# Patient Record
Sex: MALE | Race: WHITE | NOT HISPANIC OR LATINO | Employment: OTHER | URBAN - METROPOLITAN AREA
[De-identification: names, ages, dates, MRNs, and addresses within clinical notes are randomized per-mention and may not be internally consistent; named-entity substitution may affect disease eponyms.]

---

## 2017-01-01 ENCOUNTER — HOSPITAL ENCOUNTER (OUTPATIENT)
Dept: CARDIOLOGY | Facility: HOSPITAL | Age: 82
Discharge: HOME | End: 2017-12-27
Admitting: NURSE PRACTITIONER

## 2017-01-01 ENCOUNTER — OFFICE VISIT (OUTPATIENT)
Dept: CARDIOLOGY | Facility: HOSPITAL | Age: 82
End: 2017-01-01

## 2017-01-01 VITALS
SYSTOLIC BLOOD PRESSURE: 116 MMHG | TEMPERATURE: 97 F | WEIGHT: 164 LBS | RESPIRATION RATE: 19 BRPM | HEIGHT: 71 IN | HEART RATE: 68 BPM | OXYGEN SATURATION: 98 % | DIASTOLIC BLOOD PRESSURE: 68 MMHG | BODY MASS INDEX: 22.96 KG/M2

## 2017-01-01 DIAGNOSIS — N18.30 STAGE 3 CHRONIC KIDNEY DISEASE (HCC): ICD-10-CM

## 2017-01-01 DIAGNOSIS — I10 ESSENTIAL HYPERTENSION: ICD-10-CM

## 2017-01-01 DIAGNOSIS — R53.83 OTHER FATIGUE: ICD-10-CM

## 2017-01-01 DIAGNOSIS — R60.9 EDEMA, UNSPECIFIED TYPE: ICD-10-CM

## 2017-01-01 DIAGNOSIS — I48.92 ATRIAL FLUTTER, PAROXYSMAL (HCC): Primary | ICD-10-CM

## 2017-01-01 LAB
ANION GAP SERPL CALCULATED.3IONS-SCNC: 10 MMOL/L (ref 3–11)
BUN BLD-MCNC: 40 MG/DL (ref 9–23)
BUN/CREAT SERPL: 19 (ref 7–25)
CALCIUM SPEC-SCNC: 8.5 MG/DL (ref 8.7–10.4)
CHLORIDE SERPL-SCNC: 110 MMOL/L (ref 99–109)
CO2 SERPL-SCNC: 23 MMOL/L (ref 20–31)
CREAT BLD-MCNC: 2.1 MG/DL (ref 0.6–1.3)
GFR SERPL CREATININE-BSD FRML MDRD: 30 ML/MIN/1.73
GLUCOSE BLD-MCNC: 93 MG/DL (ref 70–100)
POTASSIUM BLD-SCNC: 5 MMOL/L (ref 3.5–5.5)
SODIUM BLD-SCNC: 143 MMOL/L (ref 132–146)

## 2017-01-01 PROCEDURE — 93005 ELECTROCARDIOGRAM TRACING: CPT | Performed by: NURSE PRACTITIONER

## 2017-01-01 PROCEDURE — 80048 BASIC METABOLIC PNL TOTAL CA: CPT | Performed by: NURSE PRACTITIONER

## 2017-01-01 PROCEDURE — 93010 ELECTROCARDIOGRAM REPORT: CPT | Performed by: INTERNAL MEDICINE

## 2017-01-05 ENCOUNTER — OFFICE VISIT (OUTPATIENT)
Dept: INTERNAL MEDICINE | Facility: CLINIC | Age: 82
End: 2017-01-05

## 2017-01-05 ENCOUNTER — TELEPHONE (OUTPATIENT)
Dept: INTERNAL MEDICINE | Facility: CLINIC | Age: 82
End: 2017-01-05

## 2017-01-05 VITALS
HEART RATE: 65 BPM | DIASTOLIC BLOOD PRESSURE: 68 MMHG | TEMPERATURE: 97.9 F | WEIGHT: 162.2 LBS | BODY MASS INDEX: 22.62 KG/M2 | SYSTOLIC BLOOD PRESSURE: 128 MMHG | OXYGEN SATURATION: 98 % | RESPIRATION RATE: 20 BRPM

## 2017-01-05 DIAGNOSIS — S22.000A CLOSED COMPRESSION FRACTURE OF THORACIC VERTEBRA, INITIAL ENCOUNTER (HCC): ICD-10-CM

## 2017-01-05 DIAGNOSIS — S61.219A LACERATION OF FINGER OF RIGHT HAND, INITIAL ENCOUNTER: Primary | ICD-10-CM

## 2017-01-05 PROCEDURE — 99213 OFFICE O/P EST LOW 20 MIN: CPT | Performed by: PHYSICIAN ASSISTANT

## 2017-01-05 RX ORDER — ACETAMINOPHEN WITH CODEINE 300MG-15MG
1 TABLET ORAL EVERY 6 HOURS PRN
Qty: 30 TABLET | Refills: 1 | OUTPATIENT
Start: 2017-01-05 | End: 2017-07-20

## 2017-01-05 RX ORDER — AMLODIPINE BESYLATE 5 MG/1
5 TABLET ORAL DAILY
Qty: 30 TABLET | Refills: 5 | Status: SHIPPED | OUTPATIENT
Start: 2017-01-05 | End: 2017-07-03 | Stop reason: SDUPTHER

## 2017-01-05 NOTE — TELEPHONE ENCOUNTER
Spoke with pharmacist (Fany) to call in Rx for Tylenol #2 per Dr. Aguilar. Fany advised didn't have any in stock but could order it. Spoke with Provider who stated that was fine to have pharmacy order it for patient. Fany stated she would order medication and call back only if there was an issue ordering Tylenol #2. Thanked her and ended the call. Informed Ms. Love (provider) that medication would be ordered.

## 2017-01-05 NOTE — MR AVS SNAPSHOT
Kareem Steiner   1/5/2017 1:00 PM   Office Visit    Provider:  SHANIQUA Littlejohn   Department:  [facility]   Dept Phone:  [phone]                Your Full Care Plan              Where to Get Your Medications      These medications were sent to [facility] [address] [place] [address] - [address] AT [facility] [address] - [phone] PH - [phone] FX  [address], [place] [address]     Phone:  [phone]     amLODIPine 5 MG tablet            Your Updated Medication List          This list is accurate as of: 1/5/17  1:50 PM.  Always use your most recent med list.                amLODIPine 5 MG tablet   Commonly known as:  NORVASC   Take 1 tablet by mouth Daily.       aspirin 325 MG tablet       chlorthalidone 25 MG tablet   Commonly known as:  HYGROTON       desmopressin 0.2 MG tablet   Commonly known as:  DDAVP       donepezil 10 MG tablet   Commonly known as:  ARICEPT   Take 1 tablet by mouth Daily.       EYE VITAMINS capsule       hydrALAZINE 10 MG tablet   Commonly known as:  APRESOLINE   Take 1 tablet by mouth 3 (three) times a day.       melatonin 5 MG tablet tablet   Take 1 tablet by mouth at night as needed (SLEEP).       metoprolol tartrate 25 MG tablet   Commonly known as:  LOPRESSOR       Multiple Vitamins tablet       oxybutynin XL 15 MG 24 hr tablet   Commonly known as:  DITROPAN XL       tamsulosin 0.4 MG capsule 24 hr capsule   Commonly known as:  FLOMAX       traMADol 50 MG tablet   Commonly known as:  ULTRAM   Take 1 tablet by mouth Every 6 (Six) Hours As Needed for moderate pain (4-6).               We Performed the Following     Ambulatory Referral to Hand Surgery     Suture Removal       You Were Diagnosed With        Codes Comments    Laceration of finger of right hand, initial encounter    -  Primary ICD-10-CM: S61.219A  ICD-9-CM: 883.0       Instructions     None    Patient Instructions History      [employer] Signup     Our records indicate that you have an active [facility] Se[employer]eper account.    You can view your After Visit Summary by going to [employer] and logging in with your Seakeeper username and password.  If you don't have a Seakeeper username and password but a parent or guardian has access to your record, the parent or guardian should login with their own Seakeeper username and password and access your record to view the After Visit Summary.    If you have questions, you can email [email or web address] or call [phone] to talk to our Seakeeper staff.  Remember, Seakeeper is NOT to be used for urgent needs.  For medical emergencies, dial 911.               Other Info from Your Visit           Your Appointments     May 10, 2017  9:30 AM EDT   Follow Up with Miriam Browning MD   [facility] (--)    [address] [place] [address] [address]  [facility] [address] [phone]           Arrive 15 minutes prior to appointment.              Allergies     Atorvastatin  Myalgia      Reason for Visit     Suture / Staple Removal pt needs stitches out and would like to discuss pain meds.      Vital Signs     Blood Pressure Pulse Temperature Respirations Weight Oxygen Saturation    128/68 (BP Location: Right arm) 65 97.9 °F (36.6 °C) (Temporal Artery ) 20 162 lb 3.2 oz (73.6 kg) 98%    Body Mass Index Smoking Status                22.62 kg/m2 Former Smoker          Problems and Diagnoses Noted     Open wound of finger

## 2017-01-05 NOTE — PROGRESS NOTES
Subjective   Kareem Steiner is a 89 y.o. male.   Chief Complaint   Patient presents with   • Suture / Staple Removal     pt needs stitches out and would like to discuss pain meds.     History of Present Illness     Pt here for 1 week follow up/suture removal.  Had 6 stiches placed on the palmar surface of his right ring finger on 12/25 after he cut his finger on a metal lamppost.  He has range of motion, but still has some tenderness around the lac.      His wife would also like to discuss the pain medications he was given for his back. He is currently taking tramadol 50mg as needed for a vertebral compression fracture, but wife says that this makes him drowsy for the entirety of the day. He was taking tylenol prior to being prescribed the tramadol.      Needs amlodipine refilled for HTN.     The following portions of the patient's history were reviewed and updated as appropriate: allergies, current medications, past family history, past medical history, past social history, past surgical history and problem list.    Review of Systems   Constitutional: Negative.    HENT: Negative.    Respiratory: Negative.    Cardiovascular: Negative.    Gastrointestinal: Negative.    Musculoskeletal: Positive for back pain.   Skin:        Finger laceration - needs sutures removed        Objective   Physical Exam   Constitutional: He appears well-developed.   Skin:   6 sutures removed from horizontal laceration of right ring finger.  There is some scabbing, but no erythema, warmth or drainage.     Psychiatric: He has a normal mood and affect.     Suture Removal  Date/Time: 1/5/2017 1:47 PM  Performed by: CONNIE GOMEZ  Authorized by: CONNIE GOMEZ   Consent: Verbal consent obtained.  Consent given by: patient  Body area: upper extremity  Location details: right ring finger  Wound Appearance: clean and tender  Sutures Removed: 6  Post-removal: dressing applied  Sutures placed in this facility: [facility].  Patient  tolerance: Patient tolerated the procedure well with no immediate complications          Assessment/Plan   Kareem was seen today for suture / staple removal.    Diagnoses and all orders for this visit:    Laceration of finger of right hand, initial encounter  -     Ambulatory Referral to Hand Surgery  -     Suture Removal    Closed compression fracture of thoracic vertebra, initial encounter  -     acetaminophen-codeine (TYLENOL #2) 300-15 MG per tablet; Take 1 tablet by mouth Every 6 (Six) Hours As Needed for moderate pain (4-6).    Other orders  -     amLODIPine (NORVASC) 5 MG tablet; Take 1 tablet by mouth Daily.    Spoke with Dr. Aguilar regarding pain medications.  Most other pain meds will cause drowsiness as well.  Will try tylenol #2 instead of tramadol.  Discussed this with wife who understands to give either tramadol or tylenol #2, but not both.

## 2017-02-06 RX ORDER — HYDRALAZINE HYDROCHLORIDE 10 MG/1
TABLET, FILM COATED ORAL
Qty: 90 TABLET | Refills: 5 | Status: SHIPPED | OUTPATIENT
Start: 2017-02-06 | End: 2017-08-25 | Stop reason: SDUPTHER

## 2017-05-10 ENCOUNTER — OFFICE VISIT (OUTPATIENT)
Dept: NEUROLOGY | Facility: CLINIC | Age: 82
End: 2017-05-10

## 2017-05-10 VITALS
HEIGHT: 70 IN | WEIGHT: 175 LBS | BODY MASS INDEX: 25.05 KG/M2 | DIASTOLIC BLOOD PRESSURE: 60 MMHG | SYSTOLIC BLOOD PRESSURE: 115 MMHG

## 2017-05-10 DIAGNOSIS — R26.9 ABNORMAL GAIT: ICD-10-CM

## 2017-05-10 DIAGNOSIS — F09 MILD COGNITIVE DISORDER: Primary | ICD-10-CM

## 2017-05-10 PROCEDURE — 99214 OFFICE O/P EST MOD 30 MIN: CPT | Performed by: PSYCHIATRY & NEUROLOGY

## 2017-05-10 RX ORDER — DONEPEZIL HYDROCHLORIDE 10 MG/1
10 TABLET, FILM COATED ORAL DAILY
Qty: 90 TABLET | Refills: 3 | Status: SHIPPED | OUTPATIENT
Start: 2017-05-10 | End: 2018-01-01 | Stop reason: SDUPTHER

## 2017-05-25 ENCOUNTER — APPOINTMENT (OUTPATIENT)
Dept: PHYSICAL THERAPY | Facility: HOSPITAL | Age: 82
End: 2017-05-25

## 2017-06-13 ENCOUNTER — OFFICE VISIT (OUTPATIENT)
Dept: INTERNAL MEDICINE | Facility: CLINIC | Age: 82
End: 2017-06-13

## 2017-06-13 VITALS
WEIGHT: 163 LBS | SYSTOLIC BLOOD PRESSURE: 128 MMHG | TEMPERATURE: 97.8 F | RESPIRATION RATE: 18 BRPM | BODY MASS INDEX: 23.39 KG/M2 | DIASTOLIC BLOOD PRESSURE: 70 MMHG | HEART RATE: 56 BPM

## 2017-06-13 DIAGNOSIS — R53.83 FATIGUE, UNSPECIFIED TYPE: Primary | ICD-10-CM

## 2017-06-13 LAB
ALBUMIN SERPL-MCNC: 3.5 G/DL (ref 3.2–4.8)
ALBUMIN/GLOB SERPL: 1.3 G/DL (ref 1.5–2.5)
ALP SERPL-CCNC: 102 U/L (ref 25–100)
ALT SERPL W P-5'-P-CCNC: 19 U/L (ref 7–40)
ANION GAP SERPL CALCULATED.3IONS-SCNC: 4 MMOL/L (ref 3–11)
AST SERPL-CCNC: 24 U/L (ref 0–33)
BASOPHILS # BLD AUTO: 0.04 10*3/MM3 (ref 0–0.2)
BASOPHILS NFR BLD AUTO: 0.7 % (ref 0–1)
BILIRUB BLD-MCNC: NEGATIVE MG/DL
BILIRUB SERPL-MCNC: 0.3 MG/DL (ref 0.3–1.2)
BUN BLD-MCNC: 48 MG/DL (ref 9–23)
BUN/CREAT SERPL: 21.8 (ref 7–25)
CALCIUM SPEC-SCNC: 9 MG/DL (ref 8.7–10.4)
CHLORIDE SERPL-SCNC: 113 MMOL/L (ref 99–109)
CLARITY, POC: CLEAR
CO2 SERPL-SCNC: 27 MMOL/L (ref 20–31)
COLOR UR: YELLOW
CREAT BLD-MCNC: 2.2 MG/DL (ref 0.6–1.3)
DEPRECATED RDW RBC AUTO: 52.3 FL (ref 37–54)
EOSINOPHIL # BLD AUTO: 0.42 10*3/MM3 (ref 0.1–0.3)
EOSINOPHIL NFR BLD AUTO: 6.8 % (ref 0–3)
ERYTHROCYTE [DISTWIDTH] IN BLOOD BY AUTOMATED COUNT: 15.1 % (ref 11.3–14.5)
EXPIRATION DATE: ABNORMAL
FOLATE SERPL-MCNC: >24 NG/ML (ref 3.2–20)
GFR SERPL CREATININE-BSD FRML MDRD: 28 ML/MIN/1.73
GLOBULIN UR ELPH-MCNC: 2.6 GM/DL
GLUCOSE BLD-MCNC: 91 MG/DL (ref 70–100)
GLUCOSE UR STRIP-MCNC: NEGATIVE MG/DL
HCT VFR BLD AUTO: 33.9 % (ref 38.9–50.9)
HGB BLD-MCNC: 10.5 G/DL (ref 13.1–17.5)
IMM GRANULOCYTES # BLD: 0.03 10*3/MM3 (ref 0–0.03)
IMM GRANULOCYTES NFR BLD: 0.5 % (ref 0–0.6)
KETONES UR QL: NEGATIVE
LEUKOCYTE EST, POC: NEGATIVE
LYMPHOCYTES # BLD AUTO: 0.88 10*3/MM3 (ref 0.6–4.8)
LYMPHOCYTES NFR BLD AUTO: 14.3 % (ref 24–44)
Lab: ABNORMAL
MCH RBC QN AUTO: 29.4 PG (ref 27–31)
MCHC RBC AUTO-ENTMCNC: 31 G/DL (ref 32–36)
MCV RBC AUTO: 95 FL (ref 80–99)
MONOCYTES # BLD AUTO: 0.71 10*3/MM3 (ref 0–1)
MONOCYTES NFR BLD AUTO: 11.6 % (ref 0–12)
NEUTROPHILS # BLD AUTO: 4.06 10*3/MM3 (ref 1.5–8.3)
NEUTROPHILS NFR BLD AUTO: 66.1 % (ref 41–71)
NITRITE UR-MCNC: NEGATIVE MG/ML
PH UR: 5 [PH] (ref 5–8)
PLATELET # BLD AUTO: 195 10*3/MM3 (ref 150–450)
PMV BLD AUTO: 10.8 FL (ref 6–12)
POTASSIUM BLD-SCNC: 4.7 MMOL/L (ref 3.5–5.5)
PROT SERPL-MCNC: 6.1 G/DL (ref 5.7–8.2)
PROT UR STRIP-MCNC: ABNORMAL MG/DL
RBC # BLD AUTO: 3.57 10*6/MM3 (ref 4.2–5.76)
RBC # UR STRIP: NEGATIVE /UL
SODIUM BLD-SCNC: 144 MMOL/L (ref 132–146)
SP GR UR: 1.01 (ref 1–1.03)
T4 FREE SERPL-MCNC: 0.98 NG/DL (ref 0.89–1.76)
TSH SERPL DL<=0.05 MIU/L-ACNC: 1.85 MIU/ML (ref 0.35–5.35)
UROBILINOGEN UR QL: NORMAL
VIT B12 BLD-MCNC: 1066 PG/ML (ref 211–911)
WBC NRBC COR # BLD: 6.14 10*3/MM3 (ref 3.5–10.8)

## 2017-06-13 PROCEDURE — 84443 ASSAY THYROID STIM HORMONE: CPT | Performed by: INTERNAL MEDICINE

## 2017-06-13 PROCEDURE — 82607 VITAMIN B-12: CPT | Performed by: INTERNAL MEDICINE

## 2017-06-13 PROCEDURE — 81003 URINALYSIS AUTO W/O SCOPE: CPT | Performed by: INTERNAL MEDICINE

## 2017-06-13 PROCEDURE — 36415 COLL VENOUS BLD VENIPUNCTURE: CPT | Performed by: INTERNAL MEDICINE

## 2017-06-13 PROCEDURE — 84439 ASSAY OF FREE THYROXINE: CPT | Performed by: INTERNAL MEDICINE

## 2017-06-13 PROCEDURE — 82746 ASSAY OF FOLIC ACID SERUM: CPT | Performed by: INTERNAL MEDICINE

## 2017-06-13 PROCEDURE — 99213 OFFICE O/P EST LOW 20 MIN: CPT | Performed by: INTERNAL MEDICINE

## 2017-06-13 PROCEDURE — 85025 COMPLETE CBC W/AUTO DIFF WBC: CPT | Performed by: INTERNAL MEDICINE

## 2017-06-13 PROCEDURE — 80053 COMPREHEN METABOLIC PANEL: CPT | Performed by: INTERNAL MEDICINE

## 2017-06-13 NOTE — PROGRESS NOTES
Chief Complaint   Patient presents with   • SLEEPING ALL THE TIME       History of Present Illness    The patient has complaints of being fatigued. He describes his symptoms as being sleepy and drowsiness and the symptoms have been present for one month.       He denies tearfulness, loneliness, hopelessness, anxiety or suicidal thoughts.       The patient denies a dry cough, a wet cough, wheezing, fever, facial pain, a headache, eye drainage, ear pain, ear drainage, nausea, vomiting, diarrhea, sore throat, abdominal pain, nasal discharge, decreased appetite, chills, rectal bleeding, night sweats or myalgias. There are no symptoms of heat intolerance, cold intolerance, excessive hair loss or constipation. The patient denies polyuria, polydypsia or polyphagia. There are no known exposures to mononucleosis. The patient does not exercise regularly.    Bedtime is usually at 11 pm. The patient falls asleep easily and typically awakens more than three times. The patient does not snore and denies gasping or stopping breathing at night. He feels tired during the day and he reports that he falls asleep while watching television but denies falling asleep while driving or during conversation.       Medications      Current Outpatient Prescriptions:   •  amLODIPine (NORVASC) 5 MG tablet, Take 1 tablet by mouth Daily., Disp: 30 tablet, Rfl: 5  •  aspirin 325 MG tablet, Take 1 tablet by mouth daily., Disp: , Rfl:   •  Cetirizine HCl (ZYRTEC ALLERGY PO), Take  by mouth., Disp: , Rfl:   •  chlorthalidone (HYGROTEN) 25 MG tablet, Take 50 mg by mouth Daily., Disp: , Rfl:   •  desmopressin (DDAVP) 0.2 MG tablet, Take 1 tablet by mouth every night., Disp: , Rfl:   •  donepezil (ARICEPT) 10 MG tablet, Take 1 tablet by mouth Daily., Disp: 90 tablet, Rfl: 3  •  hydrALAZINE (APRESOLINE) 10 MG tablet, TAKE ONE TABLET BY MOUTH THREE TIMES A DAY, Disp: 90 tablet, Rfl: 5  •  metoprolol tartrate (LOPRESSOR) 25 MG tablet, Take  by mouth. Take  one-half tablet by mouth twice a day, Disp: , Rfl:   •  Multiple Vitamins tablet, Take 1 tablet by mouth daily., Disp: , Rfl:   •  Multiple Vitamins-Minerals (EYE VITAMINS) capsule, Take 1 capsule by mouth daily., Disp: , Rfl:   •  oxybutynin XL (DITROPAN XL) 15 MG 24 hr tablet, Take 1 tablet by mouth daily., Disp: , Rfl:   •  Probiotic Product (PROBIOTIC ADVANCED PO), Take  by mouth., Disp: , Rfl:   •  tamsulosin (FLOMAX) 0.4 MG capsule 24 hr capsule, Take  by mouth., Disp: , Rfl:   •  acetaminophen-codeine (TYLENOL #2) 300-15 MG per tablet, Take 1 tablet by mouth Every 6 (Six) Hours As Needed for moderate pain (4-6)., Disp: 30 tablet, Rfl: 1  •  traMADol (ULTRAM) 50 MG tablet, Take 1 tablet by mouth Every 6 (Six) Hours As Needed for moderate pain (4-6)., Disp: 30 tablet, Rfl: 1     Allergies    Allergies   Allergen Reactions   • Atorvastatin Myalgia       Problem List    Patient Active Problem List   Diagnosis   • Anemia   • Abdominal aortic aneurysm   • Benign prostatic hyperplasia   • Gastroesophageal reflux disease   • Unsteady gait   • Hyperlipidemia   • Hypertension   • Mild cognitive disorder   • Cerebrovascular accident   • Bradycardia   • Constipation   • Laceration of finger of right hand   • Chronic midline low back pain without sciatica   • Closed compression fracture of thoracic vertebra       Medications, Allergies, Problems List and Past History were reviewed and updated.    Physical Examination    /70 (BP Location: Left arm, Patient Position: Sitting, Cuff Size: Adult)  Pulse 56  Temp 97.8 °F (36.6 °C) (Temporal Artery )   Resp 18  Wt 163 lb (73.9 kg)  BMI 23.39 kg/m2    HEENT: Head- Normocephalic Atraumatic. Facies- Within normal limits. Pinnas- Normal texture and shape bilaterally. Canals- Normal bilaterally. TMs- Normal bilaterally. Nares- Patent bilaterally. Nasal Septum- is normal. There is no tenderness to palpation over the frontal or maxillary sinuses. Lids- Normal bilaterally.  Conjunctiva- Clear bilaterally. Sclera- Anicteric bilaterally. Oropharynx- Moist with no lesions. Tonsils- No enlargement, erythema or exudate.    Neck: Thyroid- non enlarged, symmetric and has no nodules. No bruits are detected. ROM- Normal Range of Motion with no rigidity.    Lymph Nodes: Cervical- no enlarged lymph nodes noted. Clavicular- Deferred. Axillary- Deferred. Inguinal- Deferred.    Lungs: Auscultation- Clear to auscultation bilaterally. There are no retractions, clubbing or cyanosis. The Expiratory to Inspiratory ratio is equal.    Cardiovascular: There are no carotid bruits. Heart- Normal Rate with Regular rhythm and no murmurs. There are no gallops. There are no rubs. In the lower extremities there is no edema. The upper extremities do not have edema.    Abdomen: Soft, benign, non-tender with no masses, hernias, organomegaly or scars.    Impression and Assessment    Fatigue.    Plan    Fatigue Plan: Further plans will be made after test results are received and reviewed.    Kareem was seen today for sleeping all the time.    Diagnoses and all orders for this visit:    Fatigue, unspecified type  -     Comprehensive Metabolic Panel  -     CBC & Differential  -     Vitamin B12  -     Folate  -     TSH  -     T4, Free  -     POC Urinalysis Dipstick, Automated        Return to Office    The patient was instructed to return for follow-up at the next scheduled visit.    The patient was instructed to return sooner if the condition changes, worsens, or doesn't resolve.

## 2017-06-29 ENCOUNTER — OFFICE VISIT (OUTPATIENT)
Dept: INTERNAL MEDICINE | Facility: CLINIC | Age: 82
End: 2017-06-29

## 2017-06-29 VITALS
SYSTOLIC BLOOD PRESSURE: 118 MMHG | WEIGHT: 167 LBS | RESPIRATION RATE: 18 BRPM | BODY MASS INDEX: 23.96 KG/M2 | TEMPERATURE: 97.8 F | DIASTOLIC BLOOD PRESSURE: 72 MMHG

## 2017-06-29 DIAGNOSIS — G45.9 TRANSIENT CEREBRAL ISCHEMIA, UNSPECIFIED TYPE: ICD-10-CM

## 2017-06-29 DIAGNOSIS — I48.91 ATRIAL FIBRILLATION, UNSPECIFIED TYPE (HCC): Primary | ICD-10-CM

## 2017-06-29 PROCEDURE — 99213 OFFICE O/P EST LOW 20 MIN: CPT | Performed by: NURSE PRACTITIONER

## 2017-06-29 NOTE — PROGRESS NOTES
Concern for a fib      Subjective     History of Present Illness   The patient and wife percent clinic today with reports that had a local screening at a [employer] and found to have atrial fibrillation was advised to come here for further lab evaluation.  He denies any chest pain shortness of breath swelling.  Wife reports he said this problem with falling asleep frequently she has had some concern.        The following portions of the patient's history were reviewed and updated as appropriate: allergies, current medications, past family history, past medical history, past social history, past surgical history and problem list.    Review of Systems  No headache fever dizziness visual changes no recent cold symptoms such as runny nose cough congestion.  No abdominal pain nausea vomiting diarrhea.  No new skin changes lumps or bumps.  Objective   Physical Exam   Constitutional: He appears well-developed and well-nourished.   HENT:   Head: Normocephalic and atraumatic.   Right Ear: External ear normal.   Left Ear: External ear normal.   Nose: Nose normal.   Mouth/Throat: Oropharynx is clear and moist.   Cardiovascular: Normal rate, regular rhythm, normal heart sounds and intact distal pulses.    No murmur heard.  Pulmonary/Chest: Effort normal and breath sounds normal.   Abdominal: Soft. Bowel sounds are normal.   Skin: Skin is warm and dry.   Psychiatric: He has a normal mood and affect. His behavior is normal.   Nursing note and vitals reviewed.      Results for orders placed or performed in visit on 06/13/17   Comprehensive Metabolic Panel   Result Value Ref Range    Glucose 91 70 - 100 mg/dL    BUN 48 (H) 9 - 23 mg/dL    Creatinine 2.20 (H) 0.60 - 1.30 mg/dL    Sodium 144 132 - 146 mmol/L    Potassium 4.7 3.5 - 5.5 mmol/L    Chloride 113 (H) 99 - 109 mmol/L    CO2 27.0 20.0 - 31.0 mmol/L    Calcium 9.0 8.7 - 10.4 mg/dL    Total Protein 6.1 5.7 - 8.2 g/dL    Albumin 3.50 3.20 - 4.80 g/dL    ALT (SGPT) 19 7 - 40 U/L     AST (SGOT) 24 0 - 33 U/L    Alkaline Phosphatase 102 (H) 25 - 100 U/L    Total Bilirubin 0.3 0.3 - 1.2 mg/dL    eGFR Non African Amer 28 (L) >60 mL/min/1.73    Globulin 2.6 gm/dL    A/G Ratio 1.3 (L) 1.5 - 2.5 g/dL    BUN/Creatinine Ratio 21.8 7.0 - 25.0    Anion Gap 4.0 3.0 - 11.0 mmol/L   Vitamin B12   Result Value Ref Range    Vitamin B-12 1066 (H) 211 - 911 pg/mL   Folate   Result Value Ref Range    Folate >24.00 (H) 3.20 - 20.00 ng/mL   TSH   Result Value Ref Range    TSH 1.855 0.350 - 5.350 mIU/mL   T4, Free   Result Value Ref Range    Free T4 0.98 0.89 - 1.76 ng/dL   CBC Auto Differential   Result Value Ref Range    WBC 6.14 3.50 - 10.80 10*3/mm3    RBC 3.57 (L) 4.20 - 5.76 10*6/mm3    Hemoglobin 10.5 (L) 13.1 - 17.5 g/dL    Hematocrit 33.9 (L) 38.9 - 50.9 %    MCV 95.0 80.0 - 99.0 fL    MCH 29.4 27.0 - 31.0 pg    MCHC 31.0 (L) 32.0 - 36.0 g/dL    RDW 15.1 (H) 11.3 - 14.5 %    RDW-SD 52.3 37.0 - 54.0 fl    MPV 10.8 6.0 - 12.0 fL    Platelets 195 150 - 450 10*3/mm3    Neutrophil % 66.1 41.0 - 71.0 %    Lymphocyte % 14.3 (L) 24.0 - 44.0 %    Monocyte % 11.6 0.0 - 12.0 %    Eosinophil % 6.8 (H) 0.0 - 3.0 %    Basophil % 0.7 0.0 - 1.0 %    Immature Grans % 0.5 0.0 - 0.6 %    Neutrophils, Absolute 4.06 1.50 - 8.30 10*3/mm3    Lymphocytes, Absolute 0.88 0.60 - 4.80 10*3/mm3    Monocytes, Absolute 0.71 0.00 - 1.00 10*3/mm3    Eosinophils, Absolute 0.42 (H) 0.10 - 0.30 10*3/mm3    Basophils, Absolute 0.04 0.00 - 0.20 10*3/mm3    Immature Grans, Absolute 0.03 0.00 - 0.03 10*3/mm3   POC Urinalysis Dipstick, Automated   Result Value Ref Range    Color Yellow Yellow, Straw, Dark Yellow, Celestina    Clarity, UA Clear Clear    Glucose, UA Negative Negative, 1000 mg/dL (3+) mg/dL    Bilirubin Negative Negative    Ketones, UA Negative Negative    Specific Gravity  1.015 1.005 - 1.030    Blood, UA Negative Negative    pH, Urine 5.0 5.0 - 8.0    Protein, POC 30 mg/dL (A) Negative mg/dL    Urobilinogen, UA Normal Normal     Leukocytes Negative Negative    Nitrite, UA Negative Negative    Lot Number [id number]     Expiration Date 2-28-18         Assessment/Plan   Diagnoses and all orders for this visit:    Atrial fibrillation, unspecified type  -     Adult Transthoracic Echo Complete; Future  -     Ambulatory Referral to Cardiology    Transient cerebral ischemia, unspecified type  -     Adult Transthoracic Echo Complete; Future  -     Ambulatory Referral to Cardiology    Cr cl 22.48    ekg done 6/29/17    ECG 12 Lead  Date/Time: 6/30/2017 7:27 PM  Performed by: LILIA VIDES  Authorized by: LILIA VIDES   Comparison: compared with previous ECG   Rhythm: atrial fibrillation  Clinical impression: dysrhythmia - atrial        Will set up echo due to history of TIA as well as new-onset of A. fib.  Will set up with A. fib clinic to further evaluate and discuss with patient and wife today possibility of starting anticoagulation versus continuing the aspirin.  We'll have a cardiology consult to further evaluate as well.  He develops any chest pain or shortness of breath was advised to go to the ER he verbalizes understanding.      RTC/call  If symptoms worsen  Meds MOA and SE's reviewed and pt v/u

## 2017-06-30 ENCOUNTER — APPOINTMENT (OUTPATIENT)
Dept: LAB | Facility: HOSPITAL | Age: 82
End: 2017-06-30

## 2017-06-30 ENCOUNTER — HOSPITAL ENCOUNTER (OUTPATIENT)
Dept: CARDIOLOGY | Facility: HOSPITAL | Age: 82
Discharge: HOME | End: 2017-06-30
Admitting: NURSE PRACTITIONER

## 2017-06-30 ENCOUNTER — HOSPITAL ENCOUNTER (OUTPATIENT)
Dept: CARDIOLOGY | Facility: HOSPITAL | Age: 82
Discharge: HOME | End: 2017-06-30

## 2017-06-30 ENCOUNTER — OFFICE VISIT (OUTPATIENT)
Dept: CARDIOLOGY | Facility: HOSPITAL | Age: 82
End: 2017-06-30

## 2017-06-30 ENCOUNTER — PROCEDURE VISIT (OUTPATIENT)
Dept: CARDIOLOGY | Facility: HOSPITAL | Age: 82
End: 2017-06-30

## 2017-06-30 VITALS
BODY MASS INDEX: 23.96 KG/M2 | WEIGHT: 167.4 LBS | OXYGEN SATURATION: 97 % | RESPIRATION RATE: 16 BRPM | SYSTOLIC BLOOD PRESSURE: 111 MMHG | HEART RATE: 51 BPM | DIASTOLIC BLOOD PRESSURE: 56 MMHG | HEIGHT: 70 IN | TEMPERATURE: 97.7 F

## 2017-06-30 DIAGNOSIS — G45.9 TRANSIENT CEREBRAL ISCHEMIA, UNSPECIFIED TYPE: ICD-10-CM

## 2017-06-30 DIAGNOSIS — N18.3 CKD (CHRONIC KIDNEY DISEASE), STAGE 3 (MODERATE): ICD-10-CM

## 2017-06-30 DIAGNOSIS — R60.9 EDEMA, UNSPECIFIED TYPE: ICD-10-CM

## 2017-06-30 DIAGNOSIS — I10 ESSENTIAL HYPERTENSION: ICD-10-CM

## 2017-06-30 DIAGNOSIS — R00.1 BRADYCARDIA: Primary | ICD-10-CM

## 2017-06-30 DIAGNOSIS — I48.0 PAROXYSMAL ATRIAL FIBRILLATION (HCC): ICD-10-CM

## 2017-06-30 DIAGNOSIS — I48.91 ATRIAL FIBRILLATION, UNSPECIFIED TYPE (HCC): ICD-10-CM

## 2017-06-30 DIAGNOSIS — R00.1 BRADYCARDIA: ICD-10-CM

## 2017-06-30 PROBLEM — I77.9 RIGHT-SIDED CAROTID ARTERY DISEASE (HCC): Status: ACTIVE | Noted: 2017-06-30

## 2017-06-30 LAB
ANION GAP SERPL CALCULATED.3IONS-SCNC: 4 MMOL/L (ref 3–11)
BH CV ECHO MEAS - AI DEC SLOPE: 151.1 CM/SEC^2
BH CV ECHO MEAS - AI MAX PG: 42.8 MMHG
BH CV ECHO MEAS - AI MAX VEL: 327 CM/SEC
BH CV ECHO MEAS - AI P1/2T: 633.9 MSEC
BH CV ECHO MEAS - AO MAX PG (FULL): 7.6 MMHG
BH CV ECHO MEAS - AO MAX PG: 13.2 MMHG
BH CV ECHO MEAS - AO MEAN PG (FULL): 4.7 MMHG
BH CV ECHO MEAS - AO MEAN PG: 7.5 MMHG
BH CV ECHO MEAS - AO ROOT AREA (BSA CORRECTED): 1.6
BH CV ECHO MEAS - AO ROOT AREA: 8 CM^2
BH CV ECHO MEAS - AO ROOT DIAM: 3.2 CM
BH CV ECHO MEAS - AO V2 MAX: 181.9 CM/SEC
BH CV ECHO MEAS - AO V2 MEAN: 130.3 CM/SEC
BH CV ECHO MEAS - AO V2 VTI: 50.9 CM
BH CV ECHO MEAS - AVA(I,A): 2 CM^2
BH CV ECHO MEAS - AVA(I,D): 2 CM^2
BH CV ECHO MEAS - AVA(V,A): 2.4 CM^2
BH CV ECHO MEAS - AVA(V,D): 2.4 CM^2
BH CV ECHO MEAS - BSA(HAYCOCK): 2 M^2
BH CV ECHO MEAS - BSA: 1.9 M^2
BH CV ECHO MEAS - BZI_BMI: 24.2 KILOGRAMS/M^2
BH CV ECHO MEAS - BZI_METRIC_HEIGHT: 177.8 CM
BH CV ECHO MEAS - BZI_METRIC_WEIGHT: 76.7 KG
BH CV ECHO MEAS - EDV(CUBED): 77.6 ML
BH CV ECHO MEAS - EDV(TEICH): 81.5 ML
BH CV ECHO MEAS - EF(CUBED): 66.3 %
BH CV ECHO MEAS - EF(TEICH): 58.2 %
BH CV ECHO MEAS - ESV(CUBED): 26.2 ML
BH CV ECHO MEAS - ESV(TEICH): 34.1 ML
BH CV ECHO MEAS - FS: 30.4 %
BH CV ECHO MEAS - IVS/LVPW: 1
BH CV ECHO MEAS - IVSD: 1.2 CM
BH CV ECHO MEAS - LA DIMENSION: 4.2 CM
BH CV ECHO MEAS - LA/AO: 1.3
BH CV ECHO MEAS - LV MASS(C)D: 169.3 GRAMS
BH CV ECHO MEAS - LV MASS(C)DI: 87.2 GRAMS/M^2
BH CV ECHO MEAS - LV MAX PG: 5.6 MMHG
BH CV ECHO MEAS - LV MEAN PG: 2.8 MMHG
BH CV ECHO MEAS - LV V1 MAX: 118.5 CM/SEC
BH CV ECHO MEAS - LV V1 MEAN: 78.1 CM/SEC
BH CV ECHO MEAS - LV V1 VTI: 27.5 CM
BH CV ECHO MEAS - LVIDD: 4.3 CM
BH CV ECHO MEAS - LVIDS: 3 CM
BH CV ECHO MEAS - LVOT AREA (M): 3.8 CM^2
BH CV ECHO MEAS - LVOT AREA: 3.7 CM^2
BH CV ECHO MEAS - LVOT DIAM: 2.2 CM
BH CV ECHO MEAS - LVPWD: 1.1 CM
BH CV ECHO MEAS - MV A MAX VEL: 53.8 CM/SEC
BH CV ECHO MEAS - MV DEC TIME: 0.33 SEC
BH CV ECHO MEAS - MV E MAX VEL: 66.1 CM/SEC
BH CV ECHO MEAS - MV E/A: 1.2
BH CV ECHO MEAS - RAP SYSTOLE: 10 MMHG
BH CV ECHO MEAS - RV MAX PG: 1.2 MMHG
BH CV ECHO MEAS - RV V1 MAX: 53.8 CM/SEC
BH CV ECHO MEAS - RVSP: 35.6 MMHG
BH CV ECHO MEAS - SI(AO): 208.9 ML/M^2
BH CV ECHO MEAS - SI(CUBED): 26.5 ML/M^2
BH CV ECHO MEAS - SI(LVOT): 53.1 ML/M^2
BH CV ECHO MEAS - SI(TEICH): 24.4 ML/M^2
BH CV ECHO MEAS - SV(AO): 406 ML
BH CV ECHO MEAS - SV(CUBED): 51.5 ML
BH CV ECHO MEAS - SV(LVOT): 103.1 ML
BH CV ECHO MEAS - SV(TEICH): 47.4 ML
BH CV ECHO MEAS - TAPSE (>1.6): 1.6 CM2
BH CV ECHO MEAS - TR MAX VEL: 253 CM/SEC
BH CV XLRA - RV BASE: 3.2 CM
BH CV XLRA - RV LENGTH: 6.7 CM
BH CV XLRA - RV MID: 3.1 CM
BH CV XLRA - TDI S': 4.97 CM/SEC
BUN BLD-MCNC: 40 MG/DL (ref 9–23)
BUN/CREAT SERPL: 20 (ref 7–25)
CALCIUM SPEC-SCNC: 9 MG/DL (ref 8.7–10.4)
CHLORIDE SERPL-SCNC: 111 MMOL/L (ref 99–109)
CO2 SERPL-SCNC: 27 MMOL/L (ref 20–31)
CREAT BLD-MCNC: 2 MG/DL (ref 0.6–1.3)
GFR SERPL CREATININE-BSD FRML MDRD: 31 ML/MIN/1.73
GLUCOSE BLD-MCNC: 89 MG/DL (ref 70–100)
LEFT ATRIUM VOLUME INDEX: 26.8 ML/M2
LEFT ATRIUM VOLUME: 52 CM3
LV EF 2D ECHO EST: 60 %
POTASSIUM BLD-SCNC: 5.1 MMOL/L (ref 3.5–5.5)
SODIUM BLD-SCNC: 142 MMOL/L (ref 132–146)

## 2017-06-30 PROCEDURE — 93005 ELECTROCARDIOGRAM TRACING: CPT

## 2017-06-30 PROCEDURE — 93000 ELECTROCARDIOGRAM COMPLETE: CPT | Performed by: NURSE PRACTITIONER

## 2017-06-30 PROCEDURE — 93306 TTE W/DOPPLER COMPLETE: CPT | Performed by: INTERNAL MEDICINE

## 2017-06-30 PROCEDURE — 93010 ELECTROCARDIOGRAM REPORT: CPT | Performed by: INTERNAL MEDICINE

## 2017-06-30 PROCEDURE — 99204 OFFICE O/P NEW MOD 45 MIN: CPT | Performed by: NURSE PRACTITIONER

## 2017-06-30 PROCEDURE — 93306 TTE W/DOPPLER COMPLETE: CPT

## 2017-06-30 PROCEDURE — 93270 REMOTE 30 DAY ECG REV/REPORT: CPT

## 2017-06-30 PROCEDURE — 80048 BASIC METABOLIC PNL TOTAL CA: CPT | Performed by: NURSE PRACTITIONER

## 2017-06-30 NOTE — PATIENT INSTRUCTIONS
Atrial Fibrillation  Atrial fibrillation is a type of irregular or rapid heartbeat (arrhythmia). In atrial fibrillation, the heart quivers continuously in a chaotic pattern. This occurs when parts of the heart receive disorganized signals that make the heart unable to pump blood normally. This can increase the risk for stroke, heart failure, and other heart-related conditions. There are different types of atrial fibrillation, including:  · Paroxysmal atrial fibrillation. This type starts suddenly, and it usually stops on its own shortly after it starts.  · Persistent atrial fibrillation. This type often lasts longer than a week. It may stop on its own or with treatment.  · Long-lasting persistent atrial fibrillation. This type lasts longer than 12 months.  · Permanent atrial fibrillation. This type does not go away.  Talk with your health care provider to learn about the type of atrial fibrillation that you have.  CAUSES  This condition is caused by some heart-related conditions or procedures, including:  · A heart attack.  · Coronary artery disease.  · Heart failure.  · Heart valve conditions.  · High blood pressure.  · Inflammation of the sac that surrounds the heart (pericarditis).  · Heart surgery.  · Certain heart rhythm disorders, such as Stone-Parkinson-White syndrome.  Other causes include:  · Pneumonia.  · Obstructive sleep apnea.  · Blockage of an artery in the lungs (pulmonary embolism, or PE).  · Lung cancer.  · Chronic lung disease.  · Thyroid problems, especially if the thyroid is overactive (hyperthyroidism).  · Caffeine.  · Excessive alcohol use or illegal drug use.  · Use of some medicines, including certain decongestants and diet pills.  Sometimes, the cause cannot be found.  RISK FACTORS  This condition is more likely to develop in:  · People who are older in age.  · People who smoke.  · People who have diabetes mellitus.  · People who are overweight (obese).  · Athletes who exercise  vigorously.  SYMPTOMS  Symptoms of this condition include:  · A feeling that your heart is beating rapidly or irregularly.  · A feeling of discomfort or pain in your chest.  · Shortness of breath.  · Sudden light-headedness or weakness.  · Getting tired easily during exercise.  In some cases, there are no symptoms.  DIAGNOSIS  Your health care provider may be able to detect atrial fibrillation when taking your pulse. If detected, this condition may be diagnosed with:  · An electrocardiogram (ECG).  · A Holter monitor test that records your heartbeat patterns over a 24-hour period.  · Transthoracic echocardiogram (TTE) to evaluate how blood flows through your heart.  · Transesophageal echocardiogram (AMY) to view more detailed images of your heart.  · A stress test.  · Imaging tests, such as a CT scan or chest X-ray.  · Blood tests.  TREATMENT  The main goals of treatment are to prevent blood clots from forming and to keep your heart beating at a normal rate and rhythm. The type of treatment that you receive depends on many factors, such as your underlying medical conditions and how you feel when you are experiencing atrial fibrillation.  This condition may be treated with:  · Medicine to slow down the heart rate, bring the heart's rhythm back to normal, or prevent clots from forming.  · Electrical cardioversion. This is a procedure that resets your heart's rhythm by delivering a controlled, low-energy shock to the heart through your skin.  · Different types of ablation, such as catheter ablation, catheter ablation with pacemaker, or surgical ablation. These procedures destroy the heart tissues that send abnormal signals. When the pacemaker is used, it is placed under your skin to help your heart beat in a regular rhythm.  HOME CARE INSTRUCTIONS  · Take over-the counter and prescription medicines only as told by your health care provider.  · If your health care provider prescribed a blood-thinning medicine  (anticoagulant), take it exactly as told. Taking too much blood-thinning medicine can cause bleeding. If you do not take enough blood-thinning medicine, you will not have the protection that you need against stroke and other problems.  · Do not use tobacco products, including cigarettes, chewing tobacco, and e-cigarettes. If you need help quitting, ask your health care provider.  · If you have obstructive sleep apnea, manage your condition as told by your health care provider.  · Do not drink alcohol.  · Do not drink beverages that contain caffeine, such as coffee, soda, and tea.  · Maintain a healthy weight. Do not use diet pills unless your health care provider approves. Diet pills may make heart problems worse.  · Follow diet instructions as told by your health care provider.  · Exercise regularly as told by your health care provider.  · Keep all follow-up visits as told by your health care provider. This is important.  PREVENTION  · Avoid drinking beverages that contain caffeine or alcohol.  · Avoid certain medicines, especially medicines that are used for breathing problems.  · Avoid certain herbs and herbal medicines, such as those that contain ephedra or ginseng.  · Do not use illegal drugs, such as cocaine and amphetamines.  · Do not smoke.  · Manage your high blood pressure.  SEEK MEDICAL CARE IF:  · You notice a change in the rate, rhythm, or strength of your heartbeat.  · You are taking an anticoagulant and you notice increased bruising.  · You tire more easily when you exercise or exert yourself.  SEEK IMMEDIATE MEDICAL CARE IF:  · You have chest pain, abdominal pain, sweating, or weakness.  · You feel nauseous.  · You notice blood in your vomit, bowel movement, or urine.  · You have shortness of breath.  · You suddenly have swollen feet and ankles.  · You feel dizzy.  · You have sudden weakness or numbness of the face, arm, or leg, especially on one side of the body.  · You have trouble speaking,  trouble understanding, or both (aphasia).  · Your face or your eyelid droops on one side.  These symptoms may represent a serious problem that is an emergency. Do not wait to see if the symptoms will go away. Get medical help right away. Call your local emergency services (911 in the [place] Do not drive yourself to the hospital.     This information is not intended to replace advice given to you by your health care provider. Make sure you discuss any questions you have with your health care provider.     Document Released: 12/18/2006 Document Revised: 09/07/2016 Document Reviewed: 04/13/2016  [employer] Interactive Patient Education ©2017 [employer]

## 2017-06-30 NOTE — PROGRESS NOTES
[facility]      Encounter Date:06/30/2017     Kareem Steiner  [address] [place] [address]  [phone]    5/3/1924    Eloy Aguilar MD    Kareem Steiner is a 89 y.o. male.      Subjective:     Chief Complaint:  Establish Care (irregular heart rate)       HPI     Pt asked to be seen today by PCP for evaluation of possible atrial fib.  Pt Reports having a local health screening at his [employer].  He was told he needed to f/u with his PCP for evaluation of atrial fib.  Patient has a history of hypertension, carotid artery disease s/p Rt CEA, history of  TIA versus CVA, mild cognitive disorder.  Pt has CKD III, followed by nephrology, with chronic lower extremity edema.  States that edema is at his baseline currently.  Usually wears compression stocking, but not wearing today.  No wounds/drainage or recent cellulitis.    Pt denies CP, pressure, palpitations, dizziness, syncope, worsening dyspnea.  Pt complains of fatigue.  Wife states that he falls asleep very easily while reading and sitting in his chair during the day.  He does not sleep well at night, and has not for many years.  NO hx of sleep study.  Reports loss of balance with ambulating and has had falls in the past without injuries.    Patient Active Problem List    Diagnosis   • Right-sided carotid artery disease [I77.9]     Overview Note:     S/p Rt CEA 2014     • Closed compression fracture of thoracic vertebra [S22.000A]   • Laceration of finger of right hand [S61.219A]   • Chronic midline low back pain without sciatica [M54.5, G89.29]   • Anemia [D64.9]   • Abdominal aortic aneurysm [I71.4]   • Benign prostatic hyperplasia [N40.0]   • Gastroesophageal reflux disease [K21.9]   • Unsteady gait [R26.81]   • Hyperlipidemia [E78.5]   • Hypertension [I10]   • Mild cognitive disorder [F09]   • Cerebrovascular accident [I63.9]   • Bradycardia [R00.1]   • Constipation [K59.00]         Past Surgical History:    Procedure Laterality Date   • APPENDECTOMY     • BLADDER SURGERY     • CATARACT EXTRACTION     • PROSTATE SURGERY     • THROMBOENDARTERECTOMY         Allergies   Allergen Reactions   • Atorvastatin Myalgia         Current Outpatient Prescriptions:   •  acetaminophen-codeine (TYLENOL #2) 300-15 MG per tablet, Take 1 tablet by mouth Every 6 (Six) Hours As Needed for moderate pain (4-6)., Disp: 30 tablet, Rfl: 1  •  amLODIPine (NORVASC) 5 MG tablet, Take 1 tablet by mouth Daily., Disp: 30 tablet, Rfl: 5  •  aspirin 325 MG tablet, Take 1 tablet by mouth daily., Disp: , Rfl:   •  Cetirizine HCl (ZYRTEC ALLERGY PO), Take 10 mg by mouth Daily., Disp: , Rfl:   •  chlorthalidone (HYGROTEN) 25 MG tablet, Take 50 mg by mouth Daily., Disp: , Rfl:   •  desmopressin (DDAVP) 0.2 MG tablet, Take 1 tablet by mouth every night., Disp: , Rfl:   •  donepezil (ARICEPT) 10 MG tablet, Take 1 tablet by mouth Daily., Disp: 90 tablet, Rfl: 3  •  hydrALAZINE (APRESOLINE) 10 MG tablet, TAKE ONE TABLET BY MOUTH THREE TIMES A DAY, Disp: 90 tablet, Rfl: 5  •  metoprolol tartrate (LOPRESSOR) 25 MG tablet, Take 1 tablets by mouth 2 (Two) Times a Day., Disp: , Rfl:   •  Multiple Vitamins tablet, Take 1 tablet by mouth daily., Disp: , Rfl:   •  Multiple Vitamins-Minerals (EYE VITAMINS) capsule, Take 1 capsule by mouth daily., Disp: , Rfl:   •  Probiotic Product (PROBIOTIC ADVANCED PO), Take 1 capsule by mouth Daily., Disp: , Rfl:   •  tamsulosin (FLOMAX) 0.4 MG capsule 24 hr capsule, Take 1 capsule by mouth Daily., Disp: , Rfl:   •  traMADol (ULTRAM) 50 MG tablet, Take 1 tablet by mouth Every 6 (Six) Hours As Needed for moderate pain (4-6)., Disp: 30 tablet, Rfl: 1    The following portions of the patient's history were reviewed and updated as appropriate: allergies, current medications, past family history, past medical history, past social history, past surgical history and problem list.    Review of Systems   Constitution: Positive for  "malaise/fatigue. Negative for chills, decreased appetite, diaphoresis, fever, weakness, night sweats, weight gain and weight loss.   HENT: Positive for congestion and hearing loss. Negative for headaches and nosebleeds.         Postnasal drip   Eyes: Negative for blurred vision, visual disturbance and visual halos.   Cardiovascular: Positive for leg swelling. Negative for chest pain, claudication, cyanosis, dyspnea on exertion, irregular heartbeat, near-syncope, orthopnea, palpitations, paroxysmal nocturnal dyspnea and syncope.   Respiratory: Positive for cough, snoring and sputum production. Negative for hemoptysis, shortness of breath, sleep disturbances due to breathing and wheezing.    Endocrine: Positive for cold intolerance and polyuria. Negative for heat intolerance, polydipsia and polyphagia.   Hematologic/Lymphatic: Bruises/bleeds easily.   Skin: Negative for dry skin, itching and rash.   Musculoskeletal: Positive for falls and muscle cramps. Negative for joint pain, joint swelling, muscle weakness and myalgias.   Gastrointestinal: Negative for bloating, abdominal pain, constipation, diarrhea, dysphagia, heartburn, melena, nausea and vomiting.   Genitourinary: Positive for frequency and nocturia. Negative for dysuria, flank pain and hematuria.   Neurological: Positive for excessive daytime sleepiness and loss of balance. Negative for difficulty with concentration and dizziness.   Psychiatric/Behavioral: Positive for altered mental status and depression. The patient has insomnia. The patient is not nervous/anxious.    Allergic/Immunologic: Negative for environmental allergies.        Seasonal allergies       Objective:     Vitals:    06/30/17 1347 06/30/17 1350   BP: 149/74 111/56   BP Location: Right arm Left arm   Patient Position: Sitting Sitting   Pulse: 50 51   Resp: 16    Temp: 97.7 °F (36.5 °C)    TempSrc: Temporal Artery     SpO2: 97%    Weight: 167 lb 6.4 oz (75.9 kg)    Height: 70\" (177.8 cm)  "         Physical Exam   Constitutional: He is oriented to person, place, and time. He appears well-developed and well-nourished. No distress.   HENT:   Head: Normocephalic and atraumatic.   Mouth/Throat: Oropharynx is clear and moist.   Eyes: Conjunctivae are normal. Pupils are equal, round, and reactive to light. No scleral icterus.   Neck: No hepatojugular reflux and no JVD present. Carotid bruit is not present. No tracheal deviation present. No thyromegaly present.   Cardiovascular: Regular rhythm, normal heart sounds and intact distal pulses.  Bradycardia present.  Exam reveals no friction rub.    No murmur heard.  Pulmonary/Chest: Effort normal and breath sounds normal.   Abdominal: Soft. Bowel sounds are normal. He exhibits no distension. There is no tenderness.   Musculoskeletal: He exhibits edema (2+ bilateral edema (lower extremity)).   Lymphadenopathy:     He has no cervical adenopathy.   Neurological: He is alert and oriented to person, place, and time.   Skin: Skin is warm, dry and intact. No rash noted. No cyanosis or erythema. No pallor.   Psychiatric: He has a normal mood and affect. His behavior is normal. Thought content normal.   Vitals reviewed.      Lab and Diagnostic Review:  Office Visit on 06/13/2017   Component Date Value Ref Range Status   • Glucose 06/13/2017 91  70 - 100 mg/dL Final   • BUN 06/13/2017 48* 9 - 23 mg/dL Final   • Creatinine 06/13/2017 2.20* 0.60 - 1.30 mg/dL Final   • Sodium 06/13/2017 144  132 - 146 mmol/L Final   • Potassium 06/13/2017 4.7  3.5 - 5.5 mmol/L Final   • Chloride 06/13/2017 113* 99 - 109 mmol/L Final   • CO2 06/13/2017 27.0  20.0 - 31.0 mmol/L Final   • Calcium 06/13/2017 9.0  8.7 - 10.4 mg/dL Final   • Total Protein 06/13/2017 6.1  5.7 - 8.2 g/dL Final   • Albumin 06/13/2017 3.50  3.20 - 4.80 g/dL Final   • ALT (SGPT) 06/13/2017 19  7 - 40 U/L Final   • AST (SGOT) 06/13/2017 24  0 - 33 U/L Final   • Alkaline Phosphatase 06/13/2017 102* 25 - 100 U/L Final   •  Total Bilirubin 06/13/2017 0.3  0.3 - 1.2 mg/dL Final   • eGFR Non African Amer 06/13/2017 28* >60 mL/min/1.73 Final   • Globulin 06/13/2017 2.6  gm/dL Final   • A/G Ratio 06/13/2017 1.3* 1.5 - 2.5 g/dL Final   • BUN/Creatinine Ratio 06/13/2017 21.8  7.0 - 25.0 Final   • Anion Gap 06/13/2017 4.0  3.0 - 11.0 mmol/L Final   • Vitamin B-12 06/13/2017 1066* 211 - 911 pg/mL Final   • Folate 06/13/2017 >24.00* 3.20 - 20.00 ng/mL Final   • TSH 06/13/2017 1.855  0.350 - 5.350 mIU/mL Final   • Free T4 06/13/2017 0.98  0.89 - 1.76 ng/dL Final   • Color 06/13/2017 Yellow  Yellow, Straw, Dark Yellow, Celestina Final   • Clarity, UA 06/13/2017 Clear  Clear Final   • Glucose, UA 06/13/2017 Negative  Negative, 1000 mg/dL (3+) mg/dL Final   • Bilirubin 06/13/2017 Negative  Negative Final   • Ketones, UA 06/13/2017 Negative  Negative Final   • Specific Gravity  06/13/2017 1.015  1.005 - 1.030 Final   • Blood, UA 06/13/2017 Negative  Negative Final   • pH, Urine 06/13/2017 5.0  5.0 - 8.0 Final   • Protein, POC 06/13/2017 30 mg/dL* Negative mg/dL Final   • Urobilinogen, UA 06/13/2017 Normal  Normal Final   • Leukocytes 06/13/2017 Negative  Negative Final   • Nitrite, UA 06/13/2017 Negative  Negative Final   • Lot Number 06/13/2017 [id number]   Final   • Expiration Date 06/13/2017 2-28-18   Final   • WBC 06/13/2017 6.14  3.50 - 10.80 10*3/mm3 Final   • RBC 06/13/2017 3.57* 4.20 - 5.76 10*6/mm3 Final   • Hemoglobin 06/13/2017 10.5* 13.1 - 17.5 g/dL Final   • Hematocrit 06/13/2017 33.9* 38.9 - 50.9 % Final   • MCV 06/13/2017 95.0  80.0 - 99.0 fL Final   • MCH 06/13/2017 29.4  27.0 - 31.0 pg Final   • MCHC 06/13/2017 31.0* 32.0 - 36.0 g/dL Final   • RDW 06/13/2017 15.1* 11.3 - 14.5 % Final   • RDW-SD 06/13/2017 52.3  37.0 - 54.0 fl Final   • MPV 06/13/2017 10.8  6.0 - 12.0 fL Final   • Platelets 06/13/2017 195  150 - 450 10*3/mm3 Final   • Neutrophil % 06/13/2017 66.1  41.0 - 71.0 % Final   • Lymphocyte % 06/13/2017 14.3* 24.0 - 44.0 % Final    • Monocyte % 06/13/2017 11.6  0.0 - 12.0 % Final   • Eosinophil % 06/13/2017 6.8* 0.0 - 3.0 % Final   • Basophil % 06/13/2017 0.7  0.0 - 1.0 % Final   • Immature Grans % 06/13/2017 0.5  0.0 - 0.6 % Final   • Neutrophils, Absolute 06/13/2017 4.06  1.50 - 8.30 10*3/mm3 Final   • Lymphocytes, Absolute 06/13/2017 0.88  0.60 - 4.80 10*3/mm3 Final   • Monocytes, Absolute 06/13/2017 0.71  0.00 - 1.00 10*3/mm3 Final   • Eosinophils, Absolute 06/13/2017 0.42* 0.10 - 0.30 10*3/mm3 Final   • Basophils, Absolute 06/13/2017 0.04  0.00 - 0.20 10*3/mm3 Final   • Immature Grans, Absolute 06/13/2017 0.03  0.00 - 0.03 10*3/mm3 Final       Assessment and Plan:         1. Bradycardia  Recent concern for Arrhythmia during a life line health screening.  No documentation at the present.    - ECG 12 Lead; sinus bradycardia with first-degree AV block, 51 bpm, FL interval 416 ms    Decrease Metoprolol 25 mg 1/2 table.    - Cardiac Event Monitor; 30 day event, Dr. Ellison to read    Echo is scheduled later today.    2. Essential hypertension  Amlodipine, hydralazine, BB,     3. Edema, unspecified type  Chlorthalidone  Wear Compression stocking daily and keep legs elevated when sitting    4. CKD (chronic kidney disease), stage 3 (moderate)  Recent increase in creatinine 2.2, recheck labs today.  Followed by nephrology.  - Basic Metabolic Panel    F/u 6-8 weeks or sooner if needed.        *Please note that portions of this note were completed with a voice recognition program. Efforts were made to edit the dictations, but occasionally words are mistranscribed.

## 2017-07-03 ENCOUNTER — APPOINTMENT (OUTPATIENT)
Dept: CARDIOLOGY | Facility: HOSPITAL | Age: 82
End: 2017-07-03

## 2017-07-03 RX ORDER — AMLODIPINE BESYLATE 5 MG/1
TABLET ORAL
Qty: 30 TABLET | Refills: 5 | Status: SHIPPED | OUTPATIENT
Start: 2017-07-03 | End: 2018-01-01 | Stop reason: SDUPTHER

## 2017-07-06 ENCOUNTER — TELEPHONE (OUTPATIENT)
Dept: CARDIOLOGY | Facility: HOSPITAL | Age: 82
End: 2017-07-06

## 2017-07-06 DIAGNOSIS — I44.30 AVB (ATRIOVENTRICULAR BLOCK): Primary | ICD-10-CM

## 2017-07-06 NOTE — PROGRESS NOTES
Pt recently placed on 30 day event monitor.  Received a critical event strip showing AVB 2:1, Pause of 4.1 seconds.    Pt will stop BB.  Referral for cardiology to be completed.

## 2017-07-06 NOTE — TELEPHONE ENCOUNTER
Based on today's exam and after a review of the records, the determination was made for LUCENTIS 0.5mg treatment within 2615 [facility] Left message on patients home phone answering service.  Called daughter, Omaira, that is listed to call in regards to patient.    New orders given to have patient stop the Metoprolol and that a cardiology referral had been placed related to the cardiac monitor reports showing a low heart rate.  Omaira, patient's daughter verbally stated understanding.  Questions/concerns answered during this time.  Miriam Mccormick RN

## 2017-07-11 ENCOUNTER — OFFICE VISIT (OUTPATIENT)
Dept: CARDIOLOGY | Facility: CLINIC | Age: 82
End: 2017-07-11

## 2017-07-11 DIAGNOSIS — R00.2 PALPITATIONS: ICD-10-CM

## 2017-07-11 PROCEDURE — 93272 ECG/REVIEW INTERPRET ONLY: CPT | Performed by: INTERNAL MEDICINE

## 2017-07-14 ENCOUNTER — DOCUMENTATION (OUTPATIENT)
Dept: CARDIOLOGY | Facility: HOSPITAL | Age: 82
End: 2017-07-14

## 2017-07-14 NOTE — PROGRESS NOTES
Received event monitor strips were reviewed collected on 7/11/17 showing atrial flutter with variable conduction.  It has been noted by Dr. Ellison.  Patient is scheduled for cardiology consult with Dr. Gomez on 8/2/17.  BB recently stopped due to bradycardia (HR 30-50s) and pauses.  Pt is a [place] 5, currently on ASA, but is also a high risk for falls.  Anticoagulation per cardiology.

## 2017-07-20 ENCOUNTER — OFFICE VISIT (OUTPATIENT)
Dept: INTERNAL MEDICINE | Facility: CLINIC | Age: 82
End: 2017-07-20

## 2017-07-20 VITALS
SYSTOLIC BLOOD PRESSURE: 124 MMHG | DIASTOLIC BLOOD PRESSURE: 60 MMHG | RESPIRATION RATE: 18 BRPM | WEIGHT: 169 LBS | HEART RATE: 60 BPM | TEMPERATURE: 97.4 F | BODY MASS INDEX: 24.25 KG/M2

## 2017-07-20 DIAGNOSIS — K21.9 GASTROESOPHAGEAL REFLUX DISEASE WITHOUT ESOPHAGITIS: ICD-10-CM

## 2017-07-20 DIAGNOSIS — E78.5 HYPERLIPIDEMIA, UNSPECIFIED HYPERLIPIDEMIA TYPE: ICD-10-CM

## 2017-07-20 DIAGNOSIS — I48.91 ATRIAL FIBRILLATION, UNSPECIFIED TYPE (HCC): Primary | ICD-10-CM

## 2017-07-20 DIAGNOSIS — I10 ESSENTIAL HYPERTENSION: ICD-10-CM

## 2017-07-20 LAB
ALBUMIN SERPL-MCNC: 3.4 G/DL (ref 3.2–4.8)
ALBUMIN/GLOB SERPL: 1.5 G/DL (ref 1.5–2.5)
ALP SERPL-CCNC: 101 U/L (ref 25–100)
ALT SERPL W P-5'-P-CCNC: 16 U/L (ref 7–40)
ANION GAP SERPL CALCULATED.3IONS-SCNC: 7 MMOL/L (ref 3–11)
ARTICHOKE IGE QN: 82 MG/DL (ref 0–130)
AST SERPL-CCNC: 17 U/L (ref 0–33)
BASOPHILS # BLD AUTO: 0.04 10*3/MM3 (ref 0–0.2)
BASOPHILS NFR BLD AUTO: 0.6 % (ref 0–1)
BILIRUB SERPL-MCNC: 0.3 MG/DL (ref 0.3–1.2)
BUN BLD-MCNC: 54 MG/DL (ref 9–23)
BUN/CREAT SERPL: 27 (ref 7–25)
CALCIUM SPEC-SCNC: 8.8 MG/DL (ref 8.7–10.4)
CHLORIDE SERPL-SCNC: 114 MMOL/L (ref 99–109)
CHOLEST SERPL-MCNC: 153 MG/DL (ref 0–200)
CO2 SERPL-SCNC: 22 MMOL/L (ref 20–31)
CREAT BLD-MCNC: 2 MG/DL (ref 0.6–1.3)
DEPRECATED RDW RBC AUTO: 49.6 FL (ref 37–54)
EOSINOPHIL # BLD AUTO: 0.4 10*3/MM3 (ref 0–0.3)
EOSINOPHIL NFR BLD AUTO: 6.4 % (ref 0–3)
ERYTHROCYTE [DISTWIDTH] IN BLOOD BY AUTOMATED COUNT: 14.7 % (ref 11.3–14.5)
GFR SERPL CREATININE-BSD FRML MDRD: 31 ML/MIN/1.73
GLOBULIN UR ELPH-MCNC: 2.3 GM/DL
GLUCOSE BLD-MCNC: 77 MG/DL (ref 70–100)
HCT VFR BLD AUTO: 30.4 % (ref 38.9–50.9)
HDLC SERPL-MCNC: 58 MG/DL (ref 40–60)
HGB BLD-MCNC: 9.6 G/DL (ref 13.1–17.5)
IMM GRANULOCYTES # BLD: 0.03 10*3/MM3 (ref 0–0.03)
IMM GRANULOCYTES NFR BLD: 0.5 % (ref 0–0.6)
LYMPHOCYTES # BLD AUTO: 0.84 10*3/MM3 (ref 0.6–4.8)
LYMPHOCYTES NFR BLD AUTO: 13.5 % (ref 24–44)
MCH RBC QN AUTO: 28.8 PG (ref 27–31)
MCHC RBC AUTO-ENTMCNC: 31.6 G/DL (ref 32–36)
MCV RBC AUTO: 91.3 FL (ref 80–99)
MONOCYTES # BLD AUTO: 0.69 10*3/MM3 (ref 0–1)
MONOCYTES NFR BLD AUTO: 11.1 % (ref 0–12)
NEUTROPHILS # BLD AUTO: 4.24 10*3/MM3 (ref 1.5–8.3)
NEUTROPHILS NFR BLD AUTO: 67.9 % (ref 41–71)
PLATELET # BLD AUTO: 229 10*3/MM3 (ref 150–450)
PMV BLD AUTO: 10.1 FL (ref 6–12)
POTASSIUM BLD-SCNC: 4.4 MMOL/L (ref 3.5–5.5)
PROT SERPL-MCNC: 5.7 G/DL (ref 5.7–8.2)
RBC # BLD AUTO: 3.33 10*6/MM3 (ref 4.2–5.76)
SODIUM BLD-SCNC: 143 MMOL/L (ref 132–146)
TRIGL SERPL-MCNC: 55 MG/DL (ref 0–150)
TSH SERPL DL<=0.05 MIU/L-ACNC: 1.59 MIU/ML (ref 0.35–5.35)
WBC NRBC COR # BLD: 6.24 10*3/MM3 (ref 3.5–10.8)

## 2017-07-20 PROCEDURE — 80053 COMPREHEN METABOLIC PANEL: CPT | Performed by: INTERNAL MEDICINE

## 2017-07-20 PROCEDURE — 36415 COLL VENOUS BLD VENIPUNCTURE: CPT | Performed by: INTERNAL MEDICINE

## 2017-07-20 PROCEDURE — 85025 COMPLETE CBC W/AUTO DIFF WBC: CPT | Performed by: INTERNAL MEDICINE

## 2017-07-20 PROCEDURE — 80061 LIPID PANEL: CPT | Performed by: INTERNAL MEDICINE

## 2017-07-20 PROCEDURE — 99214 OFFICE O/P EST MOD 30 MIN: CPT | Performed by: INTERNAL MEDICINE

## 2017-07-20 PROCEDURE — 84443 ASSAY THYROID STIM HORMONE: CPT | Performed by: INTERNAL MEDICINE

## 2017-07-20 RX ORDER — OXYBUTYNIN CHLORIDE 15 MG/1
15 TABLET, EXTENDED RELEASE ORAL DAILY
COMMUNITY
End: 2018-01-01

## 2017-07-20 RX ORDER — FAMOTIDINE 20 MG/1
20 TABLET, FILM COATED ORAL 2 TIMES DAILY
Qty: 60 TABLET | Refills: 5 | Status: SHIPPED | OUTPATIENT
Start: 2017-07-20 | End: 2018-01-01 | Stop reason: SDUPTHER

## 2017-07-20 NOTE — PROGRESS NOTES
Chief Complaint   Patient presents with   • Follow-up       History of Present Illness    The patient presents for a follow-up related to hyperlipidemia. He is following a low fat diet. He reports that he is exercising. He is not taking medication for hyperlipidemia. He denies chest pain, shortness of breath, orthopnea, paroxysmal nocturnal dyspnea, dyspnea on exertion, edema, palpitations or syncope.    The patient presents for a follow-up related to hypertension. The patient reports that he has had no headaches or blurred vision. He states that he is taking his medication as prescribed. He is not having medication side effects.    The patient is not on medication for his gastroesophageal reflux. He reports no abdominal pain, belching, diarrhea, dysphagia, early satiety, heartburn, hoarseness, nausea, odynophagia, rectal bleeding, vomiting or weight loss. He has a cough after eating. The GERD has no known aggravating factors.    The patient presents for a follow-up of chronic atrial fibrillation and he denies palpitations. The patient denies fatigue, dizziness or exercise intolerance.    Medications      Current Outpatient Prescriptions:   •  amLODIPine (NORVASC) 5 MG tablet, TAKE ONE TABLET BY MOUTH DAILY, Disp: 30 tablet, Rfl: 5  •  aspirin 325 MG tablet, Take 1 tablet by mouth daily., Disp: , Rfl:   •  Cetirizine HCl (ZYRTEC ALLERGY PO), Take 10 mg by mouth Daily., Disp: , Rfl:   •  donepezil (ARICEPT) 10 MG tablet, Take 1 tablet by mouth Daily., Disp: 90 tablet, Rfl: 3  •  hydrALAZINE (APRESOLINE) 10 MG tablet, TAKE ONE TABLET BY MOUTH THREE TIMES A DAY, Disp: 90 tablet, Rfl: 5  •  Multiple Vitamins-Minerals (EYE VITAMINS) capsule, Take 1 capsule by mouth daily., Disp: , Rfl:   •  oxybutynin XL (DITROPAN XL) 15 MG 24 hr tablet, Take 15 mg by mouth Daily., Disp: , Rfl:   •  Probiotic Product (PROBIOTIC ADVANCED PO), Take 1 capsule by mouth Daily., Disp: , Rfl:   •  tamsulosin (FLOMAX) 0.4 MG capsule 24 hr  capsule, Take 1 capsule by mouth Daily., Disp: , Rfl:      Allergies    Allergies   Allergen Reactions   • Atorvastatin Myalgia       Problem List    Patient Active Problem List   Diagnosis   • Anemia   • Abdominal aortic aneurysm   • Benign prostatic hyperplasia   • Gastroesophageal reflux disease   • Unsteady gait   • Hyperlipidemia   • Hypertension   • Mild cognitive disorder   • Cerebrovascular accident   • Bradycardia   • Constipation   • Laceration of finger of right hand   • Chronic midline low back pain without sciatica   • Closed compression fracture of thoracic vertebra   • Right-sided carotid artery disease       Medications, Allergies, Problems List and Past History were reviewed and updated.    Physical Examination    /60 (BP Location: Left arm, Patient Position: Sitting, Cuff Size: Adult)  Pulse 60  Temp 97.4 °F (36.3 °C) (Temporal Artery )   Resp 18  Wt 169 lb (76.7 kg)  BMI 24.25 kg/m2    HEENT: Head- Normocephalic Atraumatic. Facies- Within normal limits. Pinnas- Normal texture and shape bilaterally. Canals- Normal bilaterally. TMs- Normal bilaterally. Nares- Patent bilaterally. Nasal Septum- is normal. There is no tenderness to palpation over the frontal or maxillary sinuses. Lids- Normal bilaterally. Conjunctiva- Clear bilaterally. Sclera- Anicteric bilaterally. Oropharynx- Moist with no lesions. Tonsils- No enlargement, erythema or exudate.    Neck: Thyroid- non enlarged, symmetric and has no nodules. No bruits are detected. ROM- Normal Range of Motion with no rigidity.    Lungs: Auscultation- Clear to auscultation bilaterally. There are no retractions, clubbing or cyanosis. The Expiratory to Inspiratory ratio is equal.    Cardiovascular: There are no carotid bruits. Heart- Normal Rate with Irregularly Irregular rhythm and no murmurs. There are no gallops. There are no rubs. In the lower extremities there is no edema. The upper extremities do not have edema.    Abdomen: Soft, benign,  non-tender with no masses, hernias, organomegaly or scars.    Impression and Assessment    Hyperlipidemia.    Essential Hypertension.    Gastroesophageal Reflux Disease.    Atrial Fibrillation.    Plan    Gastroesophageal Reflux Disease Plan: Medication will be added as noted below.    Hyperlipidemia Plan: He was instructed to eat a low fat diet. He was encouraged to exercise daily.    Essential Hypertension Plan: The current plan was continued.    Atrial Fibrillation Plan: The patient will follow-up with his cardiologist.    Kareem was seen today for follow-up.    Diagnoses and all orders for this visit:    Atrial fibrillation, unspecified type  -     Comprehensive Metabolic Panel  -     CBC & Differential  -     TSH    Essential hypertension  -     Comprehensive Metabolic Panel  -     TSH    Hyperlipidemia, unspecified hyperlipidemia type  -     Comprehensive Metabolic Panel  -     Lipid Panel    Gastroesophageal reflux disease without esophagitis  -     CBC & Differential  -     famotidine (PEPCID) 20 MG tablet; Take 1 tablet by mouth 2 (Two) Times a Day.        Return to Office    The patient was instructed to return for follow-up in 1 month.    The patient was instructed to return sooner if the condition changes, worsens, or doesn't resolve.

## 2017-08-02 ENCOUNTER — CONSULT (OUTPATIENT)
Dept: CARDIOLOGY | Facility: CLINIC | Age: 82
End: 2017-08-02

## 2017-08-02 VITALS
HEART RATE: 63 BPM | HEIGHT: 70 IN | BODY MASS INDEX: 25.34 KG/M2 | SYSTOLIC BLOOD PRESSURE: 134 MMHG | DIASTOLIC BLOOD PRESSURE: 75 MMHG | WEIGHT: 177 LBS

## 2017-08-02 DIAGNOSIS — I48.3 TYPICAL ATRIAL FLUTTER (HCC): ICD-10-CM

## 2017-08-02 DIAGNOSIS — I10 ESSENTIAL HYPERTENSION: Primary | ICD-10-CM

## 2017-08-02 PROBLEM — K56.609 SMALL BOWEL OBSTRUCTION (HCC): Status: ACTIVE | Noted: 2017-08-02

## 2017-08-02 PROBLEM — C61 PROSTATE CA (HCC): Status: ACTIVE | Noted: 2017-08-02

## 2017-08-02 PROBLEM — N62 GYNECOMASTIA: Status: ACTIVE | Noted: 2017-08-02

## 2017-08-02 PROBLEM — R41.3 MEMORY LOSS: Status: ACTIVE | Noted: 2017-08-02

## 2017-08-02 PROBLEM — R41.82 ALTERED MENTAL STATE: Status: ACTIVE | Noted: 2017-08-02

## 2017-08-02 PROBLEM — I48.92 ATRIAL FLUTTER (HCC): Status: ACTIVE | Noted: 2017-08-02

## 2017-08-02 PROBLEM — H91.90 DEAFNESS: Status: ACTIVE | Noted: 2017-08-02

## 2017-08-02 PROCEDURE — 99214 OFFICE O/P EST MOD 30 MIN: CPT | Performed by: INTERNAL MEDICINE

## 2017-08-02 NOTE — PROGRESS NOTES
"[facility]  Consultation H&P  Kareem Steiner  5/3/1924  [phone]    VISIT DATE:  08/02/2017    PCP: Eloy Aguialr MD  [address] [place], [place] [id number]    CC:  Chief Complaint   Patient presents with   • Slow Heart Rate     consult   • Palpitations       ASSESSMENT:   Diagnosis Plan   1. Essential hypertension     2. Typical atrial flutter       Symptomatic paroxysmal atrial flutter - advance Vascor equal to 6  High risk for chronic anticoagulation  Chronic venous insufficiency  Peripheral vascular disease status post right carotid endarterectomy  History of TIA  Anemia of chronic disease  CKD stage III  High-grade AV block, asymptomatic    PLAN:  Continue aspirin 325 mg by mouth daily for stroke prophylaxis  Agree with current antihypertensive regimen  Recommending pacemaker implantation unless he becomes symptomatic with his bradyarrhythmia.  Follow-up in 6 months    History of Present Illness   Recently was noted have an irregular heart rhythm during routine physical screening.  He underwent 30 day event monitor which revealed brief episodes of asymptomatic rate controlled paroxysmal atrial flutter and one episode of transient high-grade AV block in which he dropped 2 consecutive P waves with a total pause of 4.1 seconds..  He reports stable functional status.  Uses a cane for ambulation for short distances and uses a wheelchair for longer distances.  He denies chest pain, palpitations, dyspnea on exertion.  His unsteady on his feet and has fallen recently.  Most recent transthoracic echo which revealed preserved left ventricular systolic function and age-related valvular heart disease which was not hemodynamically significant.    PHYSICAL EXAMINATION:  Vitals:    08/02/17 1505   BP: 134/75   BP Location: Left arm   Patient Position: Sitting   Pulse: 63   Weight: 177 lb (80.3 kg)   Height: 70\" (177.8 cm)     General Appearance:    Alert, cooperative, no " distress, appears stated age   Head:    Normocephalic, without obvious abnormality, atraumatic   Eyes:    PERRL, conjunctiva/corneas clear, EOM's intact, fundi     benign, both eyes   Ears:    Normal TM's and external ear canals, both ears   Nose:   Nares normal, septum midline, mucosa normal, no drainage    or sinus tenderness   Throat:   Lips, mucosa, and tongue normal; teeth and gums normal   Neck:   Supple, symmetrical, trachea midline, no adenopathy;     thyroid:  no enlargement/tenderness/nodules; no carotid    bruit or JVD   Back:     Symmetric, no curvature, ROM normal, no CVA tenderness   Lungs:     Clear to auscultation bilaterally, respirations unlabored   Chest Wall:    No tenderness or deformity    Heart:    Regular rate and rhythm, S1 and S2 normal,II/VI sm rusb, rub   or gallop, normal carotid impulse bilaterally without bruit.   Abdomen:     Soft, non-tender, bowel sounds active all four quadrants,     no masses, no organomegaly   Extremities:   Extremities normal, atraumatic, no cyanosis. 3-4+ bialteral le edema   Pulses:   2+ and symmetric all extremities   Skin:   Skin color, texture, turgor normal, no rashes or lesions   Lymph nodes:   Cervical, supraclavicular, and axillary nodes normal           Diagnostic Data:  Procedures  Lab Results   Component Value Date    TRIG 55 07/20/2017    HDL 58 07/20/2017    LDLDIRECT 82 07/20/2017     Lab Results   Component Value Date    GLUCOSE 77 07/20/2017    BUN 54 (H) 07/20/2017    CREATININE 2.00 (H) 07/20/2017     07/20/2017    K 4.4 07/20/2017     (H) 07/20/2017    CO2 22.0 07/20/2017     Lab Results   Component Value Date    HGBA1C 5.4 10/26/2014     Lab Results   Component Value Date    WBC 6.24 07/20/2017    HGB 9.6 (L) 07/20/2017    HCT 30.4 (L) 07/20/2017     07/20/2017       PROBLEM LIST:  Patient Active Problem List   Diagnosis   • Anemia   • Abdominal aortic aneurysm   • Benign prostatic hyperplasia   • Gastroesophageal reflux  disease   • Unsteady gait   • Hyperlipidemia   • Hypertension   • Mild cognitive disorder   • Cerebrovascular accident   • Bradycardia   • Constipation   • Laceration of finger of right hand   • Chronic midline low back pain without sciatica   • Closed compression fracture of thoracic vertebra   • Right-sided carotid artery disease   • Gynecomastia   • Memory loss   • Altered mental state   • Small bowel obstruction   • Deafness   • Prostate CA   • Atrial flutter       PAST MEDICAL HX  Past Medical History:   Diagnosis Date   • Abrasion of face    • Acute sinusitis    • Clostridium difficile colitis    • Deafness    • Diarrhea    • Drowsiness    • Edema    • Gait instability    • Hyperlipidemia    • Hypertension    • Joint pain    • Knee pain    • Prostate cancer    • TIA (transient ischemic attack)    • Urinary frequency        Allergies  Allergies   Allergen Reactions   • Atorvastatin Myalgia       Current Medications    Current Outpatient Prescriptions:   •  amLODIPine (NORVASC) 5 MG tablet, TAKE ONE TABLET BY MOUTH DAILY, Disp: 30 tablet, Rfl: 5  •  aspirin 325 MG tablet, Take 1 tablet by mouth daily., Disp: , Rfl:   •  Cetirizine HCl (ZYRTEC ALLERGY PO), Take 10 mg by mouth Daily., Disp: , Rfl:   •  donepezil (ARICEPT) 10 MG tablet, Take 1 tablet by mouth Daily., Disp: 90 tablet, Rfl: 3  •  famotidine (PEPCID) 20 MG tablet, Take 1 tablet by mouth 2 (Two) Times a Day., Disp: 60 tablet, Rfl: 5  •  hydrALAZINE (APRESOLINE) 10 MG tablet, TAKE ONE TABLET BY MOUTH THREE TIMES A DAY, Disp: 90 tablet, Rfl: 5  •  Multiple Vitamins-Minerals (EYE VITAMINS) capsule, Take 1 capsule by mouth daily., Disp: , Rfl:   •  oxybutynin XL (DITROPAN XL) 15 MG 24 hr tablet, Take 15 mg by mouth Daily., Disp: , Rfl:   •  Probiotic Product (PROBIOTIC ADVANCED PO), Take 1 capsule by mouth Daily., Disp: , Rfl:   •  tamsulosin (FLOMAX) 0.4 MG capsule 24 hr capsule, Take 1 capsule by mouth Daily., Disp: , Rfl:          ROS  Review of Systems    Constitution: Positive for chills, weakness and malaise/fatigue.   Cardiovascular: Positive for chest pain, irregular heartbeat and leg swelling.   Respiratory: Positive for cough and snoring.    Endocrine: Positive for cold intolerance, heat intolerance and polyuria.   Skin: Positive for itching and skin cancer.   Musculoskeletal: Positive for back pain, falls, joint swelling, muscle cramps and myalgias.   Gastrointestinal: Positive for bloating, change in bowel habit, constipation, diarrhea and heartburn.   Genitourinary: Positive for frequency.   Neurological: Positive for disturbances in coordination and loss of balance.       SOCIAL HX  Social History     Social History   • Marital status:      Spouse name: N/A   • Number of children: N/A   • Years of education: N/A     Occupational History   • Not on file.     Social History Main Topics   • Smoking status: Former Smoker   • Smokeless tobacco: Never Used      Comment: smoked a pipe daily for 50 yrs, quit 30 yrs ago   • Alcohol use No   • Drug use: No   • Sexual activity: Defer     Other Topics Concern   • Not on file     Social History Narrative    Patient consumes 3 serving of caffeine daily.     Patient lives at home with wife.            FAMILY HX  Family History   Problem Relation Age of Onset   • Diabetes Other    • Stroke Father    • Heart attack Father              Wil Gomez III, MD, FACC

## 2017-08-03 ENCOUNTER — OFFICE VISIT (OUTPATIENT)
Dept: CARDIOLOGY | Facility: CLINIC | Age: 82
End: 2017-08-03

## 2017-08-03 ENCOUNTER — TELEPHONE (OUTPATIENT)
Dept: INTERNAL MEDICINE | Facility: CLINIC | Age: 82
End: 2017-08-03

## 2017-08-03 DIAGNOSIS — R00.2 PALPITATIONS: ICD-10-CM

## 2017-08-03 PROCEDURE — 93272 ECG/REVIEW INTERPRET ONLY: CPT | Performed by: INTERNAL MEDICINE

## 2017-08-03 NOTE — TELEPHONE ENCOUNTER
S/W PT WIFE, STATES THEY FINALLY GOT IN TO SEE CARDIOLOGIST YESTERDAY BUT DO NOT FEEL THEY FOUND ANYTHING OUT.  STATES THEY SAW DR KAYE BUT THAT HE WAS IN AND OUT IN LESS THAN 5 MINUTES AND DID NOT TELL HIM ANYTHING OTHER THAN HE WOULD SEE HIM BACK IN 4 MONTHS.  STATES SHE DOESN'T KNOW IF HE HAD RESULTS OF ALL THE TESTS HE HAS HAD DONE OR NOT.  STATES HE DID NOT TELL THEM ANY RESULTS.  EXPL THAT DR KAYE HAS ACCESS TO PT'S FULL CHART AND THAT RSULTS AND REPORTS ARE IN CHART.  STATES SHE WAS JUST WANTING TO MAKE SURE EVERYTHING THAT NEEDED TO BE DONE WAS DONE AND THAT EVERYTHING IS OKAY FOR NOW.

## 2017-08-03 NOTE — TELEPHONE ENCOUNTER
----- Message from Karis Clement sent at 8/3/2017  9:31 AM EDT -----  [facility] [phone]  WIFE WOULD LIKE TO DISCUSS PT'S CARDIOLOGISTS, PLEASE CALL [facility] TO DISCUSS

## 2017-08-17 PROBLEM — I44.30 AV HEART BLOCK: Status: ACTIVE | Noted: 2017-08-17

## 2017-08-25 ENCOUNTER — OFFICE VISIT (OUTPATIENT)
Dept: CARDIOLOGY | Facility: HOSPITAL | Age: 82
End: 2017-08-25

## 2017-08-25 VITALS
SYSTOLIC BLOOD PRESSURE: 135 MMHG | HEIGHT: 71 IN | HEART RATE: 65 BPM | OXYGEN SATURATION: 99 % | TEMPERATURE: 97.2 F | WEIGHT: 172.8 LBS | RESPIRATION RATE: 16 BRPM | BODY MASS INDEX: 24.19 KG/M2 | DIASTOLIC BLOOD PRESSURE: 75 MMHG

## 2017-08-25 DIAGNOSIS — I48.3 TYPICAL ATRIAL FLUTTER (HCC): ICD-10-CM

## 2017-08-25 DIAGNOSIS — N18.3 CKD (CHRONIC KIDNEY DISEASE), STAGE 3 (MODERATE): ICD-10-CM

## 2017-08-25 DIAGNOSIS — I44.30 AV HEART BLOCK: ICD-10-CM

## 2017-08-25 DIAGNOSIS — R00.1 BRADYCARDIA: ICD-10-CM

## 2017-08-25 DIAGNOSIS — I10 ESSENTIAL HYPERTENSION: ICD-10-CM

## 2017-08-25 DIAGNOSIS — R60.9 EDEMA, UNSPECIFIED TYPE: Primary | ICD-10-CM

## 2017-08-25 LAB
ANION GAP SERPL CALCULATED.3IONS-SCNC: 11 MMOL/L (ref 3–11)
BASOPHILS # BLD AUTO: 0.04 10*3/MM3 (ref 0–0.2)
BASOPHILS NFR BLD AUTO: 0.6 % (ref 0–1)
BUN BLD-MCNC: 40 MG/DL (ref 9–23)
BUN/CREAT SERPL: 21.1 (ref 7–25)
CALCIUM SPEC-SCNC: 8.1 MG/DL (ref 8.7–10.4)
CHLORIDE SERPL-SCNC: 117 MMOL/L (ref 99–109)
CO2 SERPL-SCNC: 16 MMOL/L (ref 20–31)
CREAT BLD-MCNC: 1.9 MG/DL (ref 0.6–1.3)
DEPRECATED RDW RBC AUTO: 54.6 FL (ref 37–54)
EOSINOPHIL # BLD AUTO: 0.33 10*3/MM3 (ref 0–0.3)
EOSINOPHIL NFR BLD AUTO: 5 % (ref 0–3)
ERYTHROCYTE [DISTWIDTH] IN BLOOD BY AUTOMATED COUNT: 15.5 % (ref 11.3–14.5)
GFR SERPL CREATININE-BSD FRML MDRD: 33 ML/MIN/1.73
GLUCOSE BLD-MCNC: 78 MG/DL (ref 70–100)
HCT VFR BLD AUTO: 33.8 % (ref 38.9–50.9)
HGB BLD-MCNC: 10.6 G/DL (ref 13.1–17.5)
IMM GRANULOCYTES # BLD: 0.02 10*3/MM3 (ref 0–0.03)
IMM GRANULOCYTES NFR BLD: 0.3 % (ref 0–0.6)
LYMPHOCYTES # BLD AUTO: 0.91 10*3/MM3 (ref 0.6–4.8)
LYMPHOCYTES NFR BLD AUTO: 13.7 % (ref 24–44)
MCH RBC QN AUTO: 30.2 PG (ref 27–31)
MCHC RBC AUTO-ENTMCNC: 31.4 G/DL (ref 32–36)
MCV RBC AUTO: 96.3 FL (ref 80–99)
MONOCYTES # BLD AUTO: 0.73 10*3/MM3 (ref 0–1)
MONOCYTES NFR BLD AUTO: 11 % (ref 0–12)
NEUTROPHILS # BLD AUTO: 4.61 10*3/MM3 (ref 1.5–8.3)
NEUTROPHILS NFR BLD AUTO: 69.4 % (ref 41–71)
PLATELET # BLD AUTO: 223 10*3/MM3 (ref 150–450)
PMV BLD AUTO: 10.6 FL (ref 6–12)
POTASSIUM BLD-SCNC: 5 MMOL/L (ref 3.5–5.5)
RBC # BLD AUTO: 3.51 10*6/MM3 (ref 4.2–5.76)
SODIUM BLD-SCNC: 144 MMOL/L (ref 132–146)
WBC NRBC COR # BLD: 6.64 10*3/MM3 (ref 3.5–10.8)

## 2017-08-25 PROCEDURE — 99214 OFFICE O/P EST MOD 30 MIN: CPT | Performed by: NURSE PRACTITIONER

## 2017-08-25 PROCEDURE — 80048 BASIC METABOLIC PNL TOTAL CA: CPT | Performed by: NURSE PRACTITIONER

## 2017-08-25 PROCEDURE — 85025 COMPLETE CBC W/AUTO DIFF WBC: CPT | Performed by: NURSE PRACTITIONER

## 2017-08-25 RX ORDER — FUROSEMIDE 20 MG/1
TABLET ORAL
Qty: 20 TABLET | Refills: 3 | Status: SHIPPED | OUTPATIENT
Start: 2017-08-26 | End: 2017-09-25 | Stop reason: SDUPTHER

## 2017-08-25 RX ORDER — HYDRALAZINE HYDROCHLORIDE 10 MG/1
TABLET, FILM COATED ORAL
Qty: 90 TABLET | Refills: 4 | Status: SHIPPED | OUTPATIENT
Start: 2017-08-25 | End: 2018-01-01 | Stop reason: SDUPTHER

## 2017-08-25 NOTE — PROGRESS NOTES
[facility]      Encounter Date:08/25/2017     Kareem Steiner  [address] [place] [address]  [phone]    5/3/1924    Eloy Aguilar MD    Kareem Steiner is a 89 y.o. male.      Subjective:     Chief Complaint:  Follow-up (edema)       Leg Swelling   This is a new problem. The current episode started 1 to 4 weeks ago. The problem occurs daily. The problem has been gradually worsening. Associated symptoms include congestion (post nasal drip) and coughing. Pertinent negatives include no abdominal pain, chest pain, chills, diaphoresis, fever, headaches, joint swelling, myalgias, nausea, rash, vomiting or weakness. Exacerbated by: sitting and dangling legs. Treatments tried: Has not worn compression stockings and likes to sit with legs danling during day.      Recently was noted to have an irregular heart rhythm during routine physical screening.  He underwent 30 day event monitor which revealed brief episodes of asymptomatic rate controlled paroxysmal atrial flutter and one episode of transient high-grade AV block in which he dropped 2 consecutive P waves with a total pause of 4.1 seconds..  He reports stable functional status.  Uses a cane for ambulation for short distances and uses a wheelchair for longer distances.  He denies chest pain, palpitations, dyspnea on exertion.  He is unsteady on his feet and has fallen recently.  Due to falls cardiology continued ASA (no anticoagulation).    Most recent transthoracic echo which revealed preserved left ventricular systolic function and age-related valvular heart disease which was not hemodynamically significant.  Pt has a hx of CKD with baseline creatinine 2.0 followed by nephology    Patient Active Problem List    Diagnosis   • AV heart block [I44.30]   • Gynecomastia [N62]   • Memory loss [R41.3]   • Altered mental state [R41.82]   • Small bowel obstruction [K56.69]   • Deafness [H91.90]     Overview Note:     H/O      • Prostate CA [C61]     Overview Note:     h/o     • Atrial flutter [I48.92]     Overview Note:     · Cardiac event monitor 6/30/17: Nonsustained, rate controlled, asymptomatic atrial flutter.  1 episode 2-1 AV block and one episode of transient high-grade AV block.  Asymptomatic pars 4.1 seconds     • Right-sided carotid artery disease [I77.9]     Overview Note:     S/p Rt CEA 2014     • Closed compression fracture of thoracic vertebra [S22.000A]   • Laceration of finger of right hand [S61.219A]   • Chronic midline low back pain without sciatica [M54.5, G89.29]   • Anemia [D64.9]   • Abdominal aortic aneurysm [I71.4]   • Benign prostatic hyperplasia [N40.0]   • Gastroesophageal reflux disease [K21.9]   • Unsteady gait [R26.81]   • Hyperlipidemia [E78.5]   • Hypertension [I10]   • Mild cognitive disorder [F09]     Overview Note:     MMSE=27 (0/3 Recall -- it is unclear whether the patient heard the instructions correctly due to his hearing loss) 10/20/15     • Cerebrovascular accident [I63.9]   • Bradycardia [R00.1]     Overview Note:     Echocardiogram 6/30/17: EF 60%, mild MR, mild TR, RVSP 35.6 mmHg     • Constipation [K59.00]         Past Surgical History:   Procedure Laterality Date   • APPENDECTOMY     • BLADDER SURGERY     • CATARACT EXTRACTION     • COLON SURGERY     • PROSTATE SURGERY     • THROMBOENDARTERECTOMY         Allergies   Allergen Reactions   • Atorvastatin Myalgia         Current Outpatient Prescriptions:   •  amLODIPine (NORVASC) 5 MG tablet, TAKE ONE TABLET BY MOUTH DAILY, Disp: 30 tablet, Rfl: 5  •  aspirin 325 MG tablet, Take 1 tablet by mouth daily., Disp: , Rfl:   •  Cetirizine HCl (ZYRTEC ALLERGY PO), Take 10 mg by mouth Daily., Disp: , Rfl:   •  donepezil (ARICEPT) 10 MG tablet, Take 1 tablet by mouth Daily., Disp: 90 tablet, Rfl: 3  •  famotidine (PEPCID) 20 MG tablet, Take 1 tablet by mouth 2 (Two) Times a Day., Disp: 60 tablet, Rfl: 5  •  hydrALAZINE (APRESOLINE) 10 MG tablet, TAKE  ONE TABLET BY MOUTH THREE TIMES A DAY, Disp: 90 tablet, Rfl: 4  •  Multiple Vitamins-Minerals (EYE VITAMINS) capsule, Take 1 capsule by mouth daily., Disp: , Rfl:   •  oxybutynin XL (DITROPAN XL) 15 MG 24 hr tablet, Take 15 mg by mouth Daily., Disp: , Rfl:   •  Probiotic Product (PROBIOTIC ADVANCED PO), Take 1 capsule by mouth Daily., Disp: , Rfl:   •  tamsulosin (FLOMAX) 0.4 MG capsule 24 hr capsule, Take 1 capsule by mouth Daily., Disp: , Rfl:     The following portions of the patient's history were reviewed and updated as appropriate: allergies, current medications, past family history, past medical history, past social history, past surgical history and problem list.    Review of Systems   Constitution: Positive for malaise/fatigue. Negative for chills, decreased appetite, diaphoresis, fever, weakness, night sweats, weight gain and weight loss.   HENT: Positive for congestion (post nasal drip). Negative for headaches and nosebleeds.    Eyes: Negative for blurred vision, visual disturbance and visual halos.   Cardiovascular: Positive for leg swelling. Negative for chest pain, claudication, cyanosis, dyspnea on exertion, irregular heartbeat, near-syncope, orthopnea, palpitations, paroxysmal nocturnal dyspnea and syncope.   Respiratory: Positive for cough. Negative for hemoptysis, shortness of breath, sleep disturbances due to breathing, snoring, sputum production and wheezing.    Endocrine: Positive for polyuria. Negative for cold intolerance, heat intolerance, polydipsia and polyphagia.   Hematologic/Lymphatic: Does not bruise/bleed easily.   Skin: Negative for dry skin, itching and rash.   Musculoskeletal: Positive for joint pain, muscle cramps and muscle weakness. Negative for falls, joint swelling and myalgias.   Gastrointestinal: Positive for constipation and diarrhea. Negative for bloating, abdominal pain, dysphagia, heartburn, melena, nausea and vomiting.   Genitourinary: Positive for frequency and  "nocturia. Negative for dysuria, flank pain and hematuria.   Neurological: Positive for excessive daytime sleepiness. Negative for difficulty with concentration, dizziness and loss of balance.   Psychiatric/Behavioral: Positive for altered mental status, depression and memory loss. The patient has insomnia. The patient is not nervous/anxious.    Allergic/Immunologic: Positive for environmental allergies (seasonal ).       Objective:     Vitals:    08/25/17 1135 08/25/17 1139   BP: 153/85 135/75   BP Location: Right arm Left arm   Patient Position: Sitting Sitting   Pulse: 67 65   Resp: 16    Temp: 97.2 °F (36.2 °C)    TempSrc: Temporal Artery     SpO2: 99%    Weight: 172 lb 12.8 oz (78.4 kg)    Height: 70.75\" (179.7 cm)          Physical Exam   Constitutional: He is oriented to person, place, and time. He appears well-developed and well-nourished. No distress.   HENT:   Head: Normocephalic and atraumatic.   Mouth/Throat: Oropharynx is clear and moist.   Eyes: Conjunctivae are normal. Pupils are equal, round, and reactive to light. No scleral icterus.   Neck: No hepatojugular reflux and no JVD present. Carotid bruit is not present. No tracheal deviation present. No thyromegaly present.   Cardiovascular: Normal rate, regular rhythm, normal heart sounds and intact distal pulses.  Exam reveals no friction rub.    No murmur heard.  Pulmonary/Chest: Effort normal and breath sounds normal.   Abdominal: Soft. Bowel sounds are normal. He exhibits no distension. There is no tenderness.   Musculoskeletal: He exhibits edema (2+ bilateral lower extremity edema.  no lesions or cellulitis).   Lymphadenopathy:     He has no cervical adenopathy.   Neurological: He is alert and oriented to person, place, and time.   Skin: Skin is warm, dry and intact. No rash noted. No cyanosis or erythema. No pallor.   Psychiatric: He has a normal mood and affect. His behavior is normal. Thought content normal.   Vitals reviewed.      Lab and " Diagnostic Review:  Results for orders placed or performed in visit on 08/25/17   Basic Metabolic Panel   Result Value Ref Range    Glucose 78 70 - 100 mg/dL    BUN 40 (H) 9 - 23 mg/dL    Creatinine 1.90 (H) 0.60 - 1.30 mg/dL    Sodium 144 132 - 146 mmol/L    Potassium 5.0 3.5 - 5.5 mmol/L    Chloride 117 (H) 99 - 109 mmol/L    CO2 16.0 (L) 20.0 - 31.0 mmol/L    Calcium 8.1 (L) 8.7 - 10.4 mg/dL    eGFR Non African Amer 33 (L) >60 mL/min/1.73    BUN/Creatinine Ratio 21.1 7.0 - 25.0    Anion Gap 11.0 3.0 - 11.0 mmol/L   CBC Auto Differential   Result Value Ref Range    WBC 6.64 3.50 - 10.80 10*3/mm3    RBC 3.51 (L) 4.20 - 5.76 10*6/mm3    Hemoglobin 10.6 (L) 13.1 - 17.5 g/dL    Hematocrit 33.8 (L) 38.9 - 50.9 %    MCV 96.3 80.0 - 99.0 fL    MCH 30.2 27.0 - 31.0 pg    MCHC 31.4 (L) 32.0 - 36.0 g/dL    RDW 15.5 (H) 11.3 - 14.5 %    RDW-SD 54.6 (H) 37.0 - 54.0 fl    MPV 10.6 6.0 - 12.0 fL    Platelets 223 150 - 450 10*3/mm3    Neutrophil % 69.4 41.0 - 71.0 %    Lymphocyte % 13.7 (L) 24.0 - 44.0 %    Monocyte % 11.0 0.0 - 12.0 %    Eosinophil % 5.0 (H) 0.0 - 3.0 %    Basophil % 0.6 0.0 - 1.0 %    Immature Grans % 0.3 0.0 - 0.6 %    Neutrophils, Absolute 4.61 1.50 - 8.30 10*3/mm3    Lymphocytes, Absolute 0.91 0.60 - 4.80 10*3/mm3    Monocytes, Absolute 0.73 0.00 - 1.00 10*3/mm3    Eosinophils, Absolute 0.33 (H) 0.00 - 0.30 10*3/mm3    Basophils, Absolute 0.04 0.00 - 0.20 10*3/mm3    Immature Grans, Absolute 0.02 0.00 - 0.03 10*3/mm3       Assessment and Plan:         1. Edema, unspecified type    - Basic Metabolic Panel today    - CBC Auto Differential      - furosemide (LASIX) 20 MG tablet; 1 tablet X2 days, then change to 1 tablet M,W,F.  Dispense: 20 tablet; Refill: 3  With CKD, discussed risks.  Will need to monitor closely and f/u with nephrology    - Basic Metabolic Panel; 1-2 weeks    Compression stockings.  Keep legs elevated with sitting.   Monitor for s/s of infection/cellulitis    2. Essential  hypertension  stable    3. CKD (chronic kidney disease), stage 3 (moderate)  - Basic Metabolic Panel;  Creatinine today 1.9 (baseline 2.0)    4. Bradycardia  Stable.    No s/s of bradycardia, symptomatic flutter or high grade block today      5. Typical atrial flutter    6. AV heart block    F/u 4 weeks or sooner if needed.    *Please note that portions of this note were completed with a voice recognition program. Efforts were made to edit the dictations, but occasionally words are mistranscribed.

## 2017-09-14 ENCOUNTER — LAB (OUTPATIENT)
Dept: LAB | Facility: HOSPITAL | Age: 82
End: 2017-09-14

## 2017-09-14 DIAGNOSIS — N18.3 CKD (CHRONIC KIDNEY DISEASE), STAGE 3 (MODERATE): ICD-10-CM

## 2017-09-14 DIAGNOSIS — R60.9 EDEMA, UNSPECIFIED TYPE: ICD-10-CM

## 2017-09-14 LAB
ANION GAP SERPL CALCULATED.3IONS-SCNC: 8 MMOL/L (ref 3–11)
BUN BLD-MCNC: 41 MG/DL (ref 9–23)
BUN/CREAT SERPL: 20.5 (ref 7–25)
CALCIUM SPEC-SCNC: 8.6 MG/DL (ref 8.7–10.4)
CHLORIDE SERPL-SCNC: 112 MMOL/L (ref 99–109)
CO2 SERPL-SCNC: 22 MMOL/L (ref 20–31)
CREAT BLD-MCNC: 2 MG/DL (ref 0.6–1.3)
GFR SERPL CREATININE-BSD FRML MDRD: 31 ML/MIN/1.73
GLUCOSE BLD-MCNC: 91 MG/DL (ref 70–100)
POTASSIUM BLD-SCNC: 4.6 MMOL/L (ref 3.5–5.5)
SODIUM BLD-SCNC: 142 MMOL/L (ref 132–146)

## 2017-09-14 PROCEDURE — 36415 COLL VENOUS BLD VENIPUNCTURE: CPT

## 2017-09-14 PROCEDURE — 80048 BASIC METABOLIC PNL TOTAL CA: CPT | Performed by: NURSE PRACTITIONER

## 2017-09-25 ENCOUNTER — HOSPITAL ENCOUNTER (OUTPATIENT)
Dept: CARDIOLOGY | Facility: HOSPITAL | Age: 82
Discharge: HOME | End: 2017-09-25
Admitting: NURSE PRACTITIONER

## 2017-09-25 ENCOUNTER — OFFICE VISIT (OUTPATIENT)
Dept: CARDIOLOGY | Facility: HOSPITAL | Age: 82
End: 2017-09-25

## 2017-09-25 VITALS
BODY MASS INDEX: 24.05 KG/M2 | OXYGEN SATURATION: 98 % | RESPIRATION RATE: 18 BRPM | SYSTOLIC BLOOD PRESSURE: 153 MMHG | TEMPERATURE: 97.5 F | WEIGHT: 168 LBS | HEART RATE: 61 BPM | HEIGHT: 70 IN | DIASTOLIC BLOOD PRESSURE: 78 MMHG

## 2017-09-25 DIAGNOSIS — I48.92 ATRIAL FLUTTER, UNSPECIFIED TYPE (HCC): Primary | ICD-10-CM

## 2017-09-25 DIAGNOSIS — R60.9 EDEMA, UNSPECIFIED TYPE: ICD-10-CM

## 2017-09-25 DIAGNOSIS — I49.9 IRREGULAR HEART RATE: ICD-10-CM

## 2017-09-25 DIAGNOSIS — I44.30 AV HEART BLOCK: ICD-10-CM

## 2017-09-25 DIAGNOSIS — N18.3 CKD (CHRONIC KIDNEY DISEASE), STAGE 3 (MODERATE): ICD-10-CM

## 2017-09-25 DIAGNOSIS — R60.0 LOCALIZED EDEMA: ICD-10-CM

## 2017-09-25 DIAGNOSIS — I10 ESSENTIAL HYPERTENSION: ICD-10-CM

## 2017-09-25 PROBLEM — N18.9 CKD (CHRONIC KIDNEY DISEASE): Status: ACTIVE | Noted: 2017-09-25

## 2017-09-25 PROCEDURE — 93005 ELECTROCARDIOGRAM TRACING: CPT | Performed by: NURSE PRACTITIONER

## 2017-09-25 PROCEDURE — 99214 OFFICE O/P EST MOD 30 MIN: CPT | Performed by: NURSE PRACTITIONER

## 2017-09-25 PROCEDURE — 93010 ELECTROCARDIOGRAM REPORT: CPT | Performed by: INTERNAL MEDICINE

## 2017-09-25 RX ORDER — FUROSEMIDE 20 MG/1
TABLET ORAL
Qty: 20 TABLET | Refills: 3 | Status: SHIPPED | OUTPATIENT
Start: 2017-09-25 | End: 2018-01-01 | Stop reason: SDUPTHER

## 2017-09-25 NOTE — PROGRESS NOTES
[facility]      Encounter Date:09/25/2017     Kareem Steiner  [id number]  [place] [address]  [phone]    5/3/1924    Eloy Aguilar MD    Kareem Steiner is a 89 y.o. male.      Subjective:     Chief Complaint:  Follow-up (bradycardia and edema)       HPI     Leg Swelling    The current episode started 8 weeks ago. The problem occurs daily. The problem has been gradually improving with use of Lasix 3 times per week and compression stockings. Pertinent negatives include no dyspnea, dizziness, syncope, abdominal pain, chest pain, chills, diaphoresis, fever, headaches, joint swelling, myalgias, nausea, rash, vomiting or weakness. Exacerbated by: sitting and dangling legs.      Recently was noted to have an irregular heart rhythm during routine physical screening.  He underwent 30 day event monitor which revealed brief episodes of asymptomatic rate controlled paroxysmal atrial flutter and one episode of transient high-grade AV block in which he dropped 2 consecutive P waves with a total pause of 4.1 seconds..  He reports stable functional status.  Uses a cane for ambulation for short distances and uses a wheelchair for longer distances.  He denies chest pain, palpitations, dyspnea on exertion.  He is unsteady on his feet and has fallen recently.  Due to falls cardiology continued ASA (no anticoagulation).    Most recent transthoracic echo which revealed preserved left ventricular systolic function and age-related valvular heart disease which was not hemodynamically significant.  Pt has a hx of CKD with baseline creatinine 2.0 followed by nephology       Patient Active Problem List    Diagnosis   • Localized edema [R60.0]   • CKD (chronic kidney disease) [N18.9]   • AV heart block [I44.30]   • Gynecomastia [N62]   • Memory loss [R41.3]   • Altered mental state [R41.82]   • Small bowel obstruction [K56.69]   • Deafness [H91.90]     Overview Note:     H/O     • Prostate  CA [C61]     Overview Note:     h/o     • Atrial flutter [I48.92]     Overview Note:     · Cardiac event monitor 6/30/17: Nonsustained, rate controlled, asymptomatic atrial flutter.  1 episode 2-1 AV block and one episode of transient high-grade AV block.  Asymptomatic pars 4.1 seconds     • Right-sided carotid artery disease [I77.9]     Overview Note:     S/p Rt CEA 2014     • Closed compression fracture of thoracic vertebra [S22.000A]   • Laceration of finger of right hand [S61.219A]   • Chronic midline low back pain without sciatica [M54.5, G89.29]   • Anemia [D64.9]   • Abdominal aortic aneurysm [I71.4]   • Benign prostatic hyperplasia [N40.0]   • Gastroesophageal reflux disease [K21.9]   • Unsteady gait [R26.81]   • Hyperlipidemia [E78.5]   • Hypertension [I10]   • Mild cognitive disorder [F09]     Overview Note:     MMSE=27 (0/3 Recall -- it is unclear whether the patient heard the instructions correctly due to his hearing loss) 10/20/15     • Cerebrovascular accident [I63.9]   • Bradycardia [R00.1]     Overview Note:     Echocardiogram 6/30/17: EF 60%, mild MR, mild TR, RVSP 35.6 mmHg     • Constipation [K59.00]         Past Surgical History:   Procedure Laterality Date   • APPENDECTOMY     • BLADDER SURGERY     • CATARACT EXTRACTION     • COLON SURGERY     • PROSTATE SURGERY     • THROMBOENDARTERECTOMY         Allergies   Allergen Reactions   • Atorvastatin Myalgia         Current Outpatient Prescriptions:   •  amLODIPine (NORVASC) 5 MG tablet, TAKE ONE TABLET BY MOUTH DAILY, Disp: 30 tablet, Rfl: 5  •  aspirin 325 MG tablet, Take 1 tablet by mouth daily., Disp: , Rfl:   •  Cetirizine HCl (ZYRTEC ALLERGY PO), Take 10 mg by mouth Daily., Disp: , Rfl:   •  donepezil (ARICEPT) 10 MG tablet, Take 1 tablet by mouth Daily., Disp: 90 tablet, Rfl: 3  •  famotidine (PEPCID) 20 MG tablet, Take 1 tablet by mouth 2 (Two) Times a Day., Disp: 60 tablet, Rfl: 5  •  furosemide (LASIX) 20 MG tablet, 1 tablet tablet M,W,F.,  Disp: 20 tablet, Rfl: 3  •  hydrALAZINE (APRESOLINE) 10 MG tablet, TAKE ONE TABLET BY MOUTH THREE TIMES A DAY, Disp: 90 tablet, Rfl: 4  •  Multiple Vitamins-Minerals (EYE VITAMINS) capsule, Take 1 capsule by mouth daily., Disp: , Rfl:   •  oxybutynin XL (DITROPAN XL) 15 MG 24 hr tablet, Take 15 mg by mouth Daily., Disp: , Rfl:   •  Probiotic Product (PROBIOTIC ADVANCED PO), Take 1 capsule by mouth Daily., Disp: , Rfl:   •  tamsulosin (FLOMAX) 0.4 MG capsule 24 hr capsule, Take 1 capsule by mouth Daily., Disp: , Rfl:     The following portions of the patient's history were reviewed and updated as appropriate: allergies, current medications, past family history, past medical history, past social history, past surgical history and problem list.    Review of Systems   Constitution: Negative for chills, decreased appetite, diaphoresis, fever, weakness, malaise/fatigue, night sweats, weight gain and weight loss.   HENT: Negative for congestion and nosebleeds.    Eyes: Negative for blurred vision, visual disturbance and visual halos.   Cardiovascular: Positive for leg swelling. Negative for chest pain, claudication, cyanosis, dyspnea on exertion, irregular heartbeat, near-syncope, orthopnea, palpitations, paroxysmal nocturnal dyspnea and syncope.   Respiratory: Negative for cough, hemoptysis, shortness of breath, sleep disturbances due to breathing, snoring, sputum production and wheezing.    Endocrine: Positive for cold intolerance and polyuria. Negative for heat intolerance, polydipsia and polyphagia.   Hematologic/Lymphatic: Does not bruise/bleed easily.   Skin: Negative for dry skin, itching and rash.   Musculoskeletal: Positive for muscle cramps. Negative for falls, joint pain, joint swelling, muscle weakness and myalgias.   Gastrointestinal: Positive for constipation. Negative for bloating, abdominal pain, diarrhea, dysphagia, heartburn, melena, nausea and vomiting.   Genitourinary: Positive for frequency and  "nocturia. Negative for dysuria, flank pain and hematuria.   Neurological: Negative for difficulty with concentration, excessive daytime sleepiness, dizziness, headaches and loss of balance.   Psychiatric/Behavioral: Positive for depression and memory loss. Negative for altered mental status. The patient has insomnia. The patient is not nervous/anxious.    Allergic/Immunologic: Positive for environmental allergies (seasonal).       Objective:     Vitals:    09/25/17 1315 09/25/17 1317   BP: 157/83 153/78   BP Location: Right arm Left arm   Patient Position: Sitting Sitting   Pulse: 60 61   Resp: 18    Temp: 97.5 °F (36.4 °C)    TempSrc: Temporal Artery     SpO2: 98%    Weight: 168 lb (76.2 kg)    Height: 70\" (177.8 cm)          Physical Exam   Constitutional: He is oriented to person, place, and time. He appears well-developed and well-nourished. No distress.   HENT:   Head: Normocephalic and atraumatic.   Mouth/Throat: Oropharynx is clear and moist.   Eyes: Conjunctivae are normal. Pupils are equal, round, and reactive to light. No scleral icterus.   Neck: No hepatojugular reflux and no JVD present. Carotid bruit is not present. No tracheal deviation present. No thyromegaly present.   Cardiovascular: Normal heart sounds and intact distal pulses.  An irregular rhythm present. Bradycardia present.  Exam reveals no friction rub.    No murmur heard.  Pulmonary/Chest: Effort normal and breath sounds normal.   Abdominal: Soft. Bowel sounds are normal. He exhibits no distension. There is no tenderness.   Musculoskeletal: He exhibits edema (1+ bilateral edema.).   Lymphadenopathy:     He has no cervical adenopathy.   Neurological: He is alert and oriented to person, place, and time.   Skin: Skin is warm, dry and intact. No rash noted. No cyanosis or erythema. No pallor.   Psychiatric: He has a normal mood and affect. His behavior is normal. Thought content normal.   Vitals reviewed.      Lab and Diagnostic Review:  Lab on " 09/14/2017   Component Date Value Ref Range Status   • Glucose 09/14/2017 91  70 - 100 mg/dL Final   • BUN 09/14/2017 41* 9 - 23 mg/dL Final   • Creatinine 09/14/2017 2.00* 0.60 - 1.30 mg/dL Final   • Sodium 09/14/2017 142  132 - 146 mmol/L Final   • Potassium 09/14/2017 4.6  3.5 - 5.5 mmol/L Final   • Chloride 09/14/2017 112* 99 - 109 mmol/L Final   • CO2 09/14/2017 22.0  20.0 - 31.0 mmol/L Final   • Calcium 09/14/2017 8.6* 8.7 - 10.4 mg/dL Final   • eGFR Non African Amer 09/14/2017 31* >60 mL/min/1.73 Final   • BUN/Creatinine Ratio 09/14/2017 20.5  7.0 - 25.0 Final   • Anion Gap 09/14/2017 8.0  3.0 - 11.0 mmol/L Final       Assessment and Plan:         1. Atrial flutter, unspecified type    - ECG 12 Lead; Atrial flutter with rare playful the block, heart rate 49 bpm    Currently asymptomatic.  No heart rate lowering medications    Aspirin due to falls, per cardiology    2. AV heart block      3. Essential hypertension  Continue to monitor    4. Localized edema  Lower extremity edema continues but improved  Until you wearing impression stockings daily.  Keep legs elevated when sitting.  Lasix 20 mg 3 times a week    5. CKD (chronic kidney disease), stage 3 (moderate)  Creatinine stable on last lab work.    Patient to follow-up in 12 weeks or sooner if needed.      *Please note that portions of this note were completed with a voice recognition program. Efforts were made to edit the dictations, but occasionally words are mistranscribed.

## 2017-10-18 ENCOUNTER — OFFICE VISIT (OUTPATIENT)
Dept: INTERNAL MEDICINE | Facility: CLINIC | Age: 82
End: 2017-10-18

## 2017-10-18 VITALS
BODY MASS INDEX: 23.39 KG/M2 | HEART RATE: 64 BPM | SYSTOLIC BLOOD PRESSURE: 132 MMHG | RESPIRATION RATE: 18 BRPM | WEIGHT: 163 LBS | DIASTOLIC BLOOD PRESSURE: 62 MMHG | TEMPERATURE: 97.8 F

## 2017-10-18 DIAGNOSIS — E78.5 HYPERLIPIDEMIA, UNSPECIFIED HYPERLIPIDEMIA TYPE: ICD-10-CM

## 2017-10-18 DIAGNOSIS — I10 ESSENTIAL HYPERTENSION: ICD-10-CM

## 2017-10-18 DIAGNOSIS — K21.9 GASTROESOPHAGEAL REFLUX DISEASE WITHOUT ESOPHAGITIS: Primary | ICD-10-CM

## 2017-10-18 LAB
ALBUMIN SERPL-MCNC: 3.6 G/DL (ref 3.2–4.8)
ALBUMIN/GLOB SERPL: 1.4 G/DL (ref 1.5–2.5)
ALP SERPL-CCNC: 107 U/L (ref 25–100)
ALT SERPL W P-5'-P-CCNC: 17 U/L (ref 7–40)
ANION GAP SERPL CALCULATED.3IONS-SCNC: 5 MMOL/L (ref 3–11)
ARTICHOKE IGE QN: 92 MG/DL (ref 0–130)
AST SERPL-CCNC: 21 U/L (ref 0–33)
BASOPHILS # BLD AUTO: 0.03 10*3/MM3 (ref 0–0.2)
BASOPHILS NFR BLD AUTO: 0.4 % (ref 0–1)
BILIRUB SERPL-MCNC: 0.4 MG/DL (ref 0.3–1.2)
BUN BLD-MCNC: 54 MG/DL (ref 9–23)
BUN/CREAT SERPL: 24.5 (ref 7–25)
CALCIUM SPEC-SCNC: 8.6 MG/DL (ref 8.7–10.4)
CHLORIDE SERPL-SCNC: 112 MMOL/L (ref 99–109)
CHOLEST SERPL-MCNC: 170 MG/DL (ref 0–200)
CO2 SERPL-SCNC: 23 MMOL/L (ref 20–31)
CREAT BLD-MCNC: 2.2 MG/DL (ref 0.6–1.3)
DEPRECATED RDW RBC AUTO: 51 FL (ref 37–54)
EOSINOPHIL # BLD AUTO: 0.23 10*3/MM3 (ref 0–0.3)
EOSINOPHIL NFR BLD AUTO: 3.1 % (ref 0–3)
ERYTHROCYTE [DISTWIDTH] IN BLOOD BY AUTOMATED COUNT: 14.9 % (ref 11.3–14.5)
GFR SERPL CREATININE-BSD FRML MDRD: 28 ML/MIN/1.73
GLOBULIN UR ELPH-MCNC: 2.5 GM/DL
GLUCOSE BLD-MCNC: 94 MG/DL (ref 70–100)
HCT VFR BLD AUTO: 33.2 % (ref 38.9–50.9)
HDLC SERPL-MCNC: 72 MG/DL (ref 40–60)
HGB BLD-MCNC: 10.3 G/DL (ref 13.1–17.5)
IMM GRANULOCYTES # BLD: 0.03 10*3/MM3 (ref 0–0.03)
IMM GRANULOCYTES NFR BLD: 0.4 % (ref 0–0.6)
LYMPHOCYTES # BLD AUTO: 0.93 10*3/MM3 (ref 0.6–4.8)
LYMPHOCYTES NFR BLD AUTO: 12.6 % (ref 24–44)
MCH RBC QN AUTO: 28.9 PG (ref 27–31)
MCHC RBC AUTO-ENTMCNC: 31 G/DL (ref 32–36)
MCV RBC AUTO: 93.3 FL (ref 80–99)
MONOCYTES # BLD AUTO: 0.97 10*3/MM3 (ref 0–1)
MONOCYTES NFR BLD AUTO: 13.2 % (ref 0–12)
NEUTROPHILS # BLD AUTO: 5.17 10*3/MM3 (ref 1.5–8.3)
NEUTROPHILS NFR BLD AUTO: 70.3 % (ref 41–71)
PLATELET # BLD AUTO: 189 10*3/MM3 (ref 150–450)
PMV BLD AUTO: 11.1 FL (ref 6–12)
POTASSIUM BLD-SCNC: 4.4 MMOL/L (ref 3.5–5.5)
PROT SERPL-MCNC: 6.1 G/DL (ref 5.7–8.2)
RBC # BLD AUTO: 3.56 10*6/MM3 (ref 4.2–5.76)
SODIUM BLD-SCNC: 140 MMOL/L (ref 132–146)
TRIGL SERPL-MCNC: 55 MG/DL (ref 0–150)
WBC NRBC COR # BLD: 7.36 10*3/MM3 (ref 3.5–10.8)

## 2017-10-18 PROCEDURE — 80053 COMPREHEN METABOLIC PANEL: CPT | Performed by: INTERNAL MEDICINE

## 2017-10-18 PROCEDURE — 36415 COLL VENOUS BLD VENIPUNCTURE: CPT | Performed by: INTERNAL MEDICINE

## 2017-10-18 PROCEDURE — 80061 LIPID PANEL: CPT | Performed by: INTERNAL MEDICINE

## 2017-10-18 PROCEDURE — 99214 OFFICE O/P EST MOD 30 MIN: CPT | Performed by: INTERNAL MEDICINE

## 2017-10-18 PROCEDURE — 85025 COMPLETE CBC W/AUTO DIFF WBC: CPT | Performed by: INTERNAL MEDICINE

## 2017-10-24 NOTE — PROGRESS NOTES
Chief Complaint   Patient presents with   • Follow-up     3 MONTH       History of Present Illness    The patient is on Pepcid for his gastroesophageal reflux. The medication is taken on a regular basis and gives complete relief of the symptoms. He reports no abdominal pain, belching, chest pain, diarrhea, dysphagia, early satiety, heartburn, hoarseness, nausea, odynophagia, rectal bleeding, vomiting or weight loss. The GERD has no known aggravating factors.    The patient presents for a follow-up related to hyperlipidemia. He is following a low fat diet. He reports that he is exercising. He is not taking medication for hyperlipidemia. He denies shortness of breath, orthopnea, paroxysmal nocturnal dyspnea, dyspnea on exertion, edema, palpitations or syncope.    The patient presents for a follow-up related to hypertension. The patient reports that he has had no headaches or blurred vision. He states that he is taking his medication as prescribed. He is not having medication side effects.    Review of Systems    GENERAL- Denies Fever, Chills, Sweats, Fatigue, Weakness or Malaise.    CARDIOVASCULAR- Denies Claudication.    PULMONARY- Denies Wheezing, Sputum Production, Cough, Hemoptysis or Pleuritic Chest Pain.    GASTROINTESTINAL- Denies Constipation.    Medications      Current Outpatient Prescriptions:   •  amLODIPine (NORVASC) 5 MG tablet, TAKE ONE TABLET BY MOUTH DAILY, Disp: 30 tablet, Rfl: 5  •  aspirin 325 MG tablet, Take 1 tablet by mouth daily., Disp: , Rfl:   •  Cetirizine HCl (ZYRTEC ALLERGY PO), Take 10 mg by mouth Daily., Disp: , Rfl:   •  donepezil (ARICEPT) 10 MG tablet, Take 1 tablet by mouth Daily., Disp: 90 tablet, Rfl: 3  •  famotidine (PEPCID) 20 MG tablet, Take 1 tablet by mouth 2 (Two) Times a Day., Disp: 60 tablet, Rfl: 5  •  furosemide (LASIX) 20 MG tablet, 1 tablet tablet M,W,F., Disp: 20 tablet, Rfl: 3  •  hydrALAZINE (APRESOLINE) 10 MG tablet, TAKE ONE TABLET BY MOUTH THREE TIMES A DAY,  Disp: 90 tablet, Rfl: 4  •  Multiple Vitamins-Minerals (EYE VITAMINS) capsule, Take 1 capsule by mouth daily., Disp: , Rfl:   •  oxybutynin XL (DITROPAN XL) 15 MG 24 hr tablet, Take 15 mg by mouth Daily., Disp: , Rfl:   •  Probiotic Product (PROBIOTIC ADVANCED PO), Take 1 capsule by mouth Daily., Disp: , Rfl:   •  tamsulosin (FLOMAX) 0.4 MG capsule 24 hr capsule, Take 1 capsule by mouth Daily., Disp: , Rfl:      Allergies    Allergies   Allergen Reactions   • Atorvastatin Myalgia       Problem List    Patient Active Problem List   Diagnosis   • Anemia   • Abdominal aortic aneurysm   • Benign prostatic hyperplasia   • Gastroesophageal reflux disease   • Unsteady gait   • Hyperlipidemia   • Hypertension   • Mild cognitive disorder   • Cerebrovascular accident   • Bradycardia   • Constipation   • Laceration of finger of right hand   • Chronic midline low back pain without sciatica   • Closed compression fracture of thoracic vertebra   • Right-sided carotid artery disease   • Gynecomastia   • Memory loss   • Altered mental state   • Small bowel obstruction   • Deafness   • Prostate CA   • Atrial flutter   • AV heart block   • Localized edema   • CKD (chronic kidney disease)       Medications, Allergies, Problems List and Past History were reviewed and updated.    Physical Examination    /62 (BP Location: Left arm, Patient Position: Sitting, Cuff Size: Adult)  Pulse 64  Temp 97.8 °F (36.6 °C) (Temporal Artery )   Resp 18  Wt 163 lb (73.9 kg)  BMI 23.39 kg/m2    HEENT: Head- Normocephalic Atraumatic. Facies- Within normal limits. Pinnas- Normal texture and shape bilaterally. Canals- Normal bilaterally. TMs- Normal bilaterally. Nares- Patent bilaterally. Nasal Septum- is normal. There is no tenderness to palpation over the frontal or maxillary sinuses. Lids- Normal bilaterally. Conjunctiva- Clear bilaterally. Sclera- Anicteric bilaterally. Oropharynx- Moist with no lesions. Tonsils- No enlargement, erythema or  exudate.    Neck: Thyroid- non enlarged, symmetric and has no nodules. No bruits are detected. ROM- Normal Range of Motion with no rigidity.    Lungs: Auscultation- Clear to auscultation bilaterally. There are no retractions, clubbing or cyanosis. The Expiratory to Inspiratory ratio is equal.    Cardiovascular: There are no carotid bruits. Heart- Normal Rate with Regular rhythm and no murmurs. There are no gallops. There are no rubs. In the lower extremities there is no edema. The upper extremities do not have edema.    Abdomen: Soft, benign, non-tender with no masses, hernias, organomegaly or scars.    Impression and Assessment    Gastroesophageal Reflux Disease.    Hyperlipidemia.    Essential Hypertension.    Plan    Gastroesophageal Reflux Disease Plan: The current plan was continued.    Hyperlipidemia Plan: He was instructed to eat a low fat diet. He was encouraged to exercise daily.    Essential Hypertension Plan: The current plan was continued.    Kareem was seen today for follow-up.    Diagnoses and all orders for this visit:    Gastroesophageal reflux disease without esophagitis  -     Comprehensive Metabolic Panel  -     CBC & Differential  -     CBC Auto Differential    Essential hypertension  -     Comprehensive Metabolic Panel    Hyperlipidemia, unspecified hyperlipidemia type  -     Comprehensive Metabolic Panel  -     Lipid Panel        Return to Office    The patient was instructed to return for follow-up in 4 months.    The patient was instructed to return sooner if the condition changes, worsens, or doesn't resolve.

## 2017-11-15 ENCOUNTER — OFFICE VISIT (OUTPATIENT)
Dept: NEUROLOGY | Facility: CLINIC | Age: 82
End: 2017-11-15

## 2017-11-15 VITALS
SYSTOLIC BLOOD PRESSURE: 130 MMHG | BODY MASS INDEX: 23.62 KG/M2 | WEIGHT: 165 LBS | HEIGHT: 70 IN | DIASTOLIC BLOOD PRESSURE: 68 MMHG

## 2017-11-15 DIAGNOSIS — F09 MILD COGNITIVE DISORDER: Primary | ICD-10-CM

## 2017-11-15 PROCEDURE — 99214 OFFICE O/P EST MOD 30 MIN: CPT | Performed by: PSYCHIATRY & NEUROLOGY

## 2017-11-15 NOTE — PROGRESS NOTES
Subjective   Kareem Steiner is a 89 y.o. male.     Chief Complaint   Patient presents with   • Mild cognitive disorder     f/u       History of Present Illness   Pt originally seen April 2015 and followed for mild cognitive impairment. He had workup originally including brain MRI and laboratory testing including B12, folate and TSH. He was started on donepezil. October 2015 MMSE was 27/30, but ?hearing loss affecting score on memory.      He had repeat head CT on 5/1/16 while hospitalized for cardiac arrhythmia, constipation and urinary retention 2/2 prostate. Wife notes he was feeling unwell, sleeping on couch, snoring loudly, and she couldn't wake him. His benadryl was discontinued, and other medications were adjusted. No delirium while hospitalized.    11/16: he and his daughter note no decline in cognitive abilities including ability to perform ADLs, worsening of memory, orientation, judgment or language. He continues to read, particularly history. MMSE 30/30. Noted likely benefit from donepezil.   5/17: pt feels doing well cognitively, dtr notes good days and bad, but stable cognition overall. Balance is worse, several near falls. Can't say if feet numb. Hands work OK. Limited walking by knee pain as well. Sleeps poorly, up 2-3 times. Falls asleep sitting in day, pt denies. MMSE was 27/30, 0/3 on recall.   Today: dtr reports he has trouble with memory, repeats a lot, gets disoriented, cannot plan, or take meds correctly, but no hallucinations or delusions. If eg an appt changes, he cannot adjust to it, gets agitated and cannot take in the new information. Performs own ADLs, although wife sets out clothes for him. He feels he is doing well, same problems with getting up to bathroom in night, and daytime sleepiness, which he denies. Dtr unsure how to deal with his inability to learn new things (eg new remote, unable to use after 3 weeks) and his inability to reorient.    The following portions of the patient's  history were reviewed and updated as appropriate: allergies, current medications, past family history, past medical history, past social history, past surgical history and problem list.    Allergies   Allergen Reactions   • Atorvastatin Myalgia       Current Outpatient Prescriptions on File Prior to Visit   Medication Sig Dispense Refill   • amLODIPine (NORVASC) 5 MG tablet TAKE ONE TABLET BY MOUTH DAILY 30 tablet 5   • aspirin 325 MG tablet Take 1 tablet by mouth daily.     • Cetirizine HCl (ZYRTEC ALLERGY PO) Take 10 mg by mouth Daily.     • donepezil (ARICEPT) 10 MG tablet Take 1 tablet by mouth Daily. 90 tablet 3   • famotidine (PEPCID) 20 MG tablet Take 1 tablet by mouth 2 (Two) Times a Day. 60 tablet 5   • furosemide (LASIX) 20 MG tablet 1 tablet tablet M,W,F. 20 tablet 3   • hydrALAZINE (APRESOLINE) 10 MG tablet TAKE ONE TABLET BY MOUTH THREE TIMES A DAY 90 tablet 4   • Multiple Vitamins-Minerals (EYE VITAMINS) capsule Take 1 capsule by mouth daily.     • oxybutynin XL (DITROPAN XL) 15 MG 24 hr tablet Take 15 mg by mouth Daily.     • Probiotic Product (PROBIOTIC ADVANCED PO) Take 1 capsule by mouth Daily.     • tamsulosin (FLOMAX) 0.4 MG capsule 24 hr capsule Take 1 capsule by mouth Daily.       No current facility-administered medications on file prior to visit.        Past Medical History:   Diagnosis Date   • Abrasion of face    • Acute sinusitis    • Clostridium difficile colitis    • Deafness    • Diarrhea    • Drowsiness    • Edema    • Gait instability    • Hyperlipidemia    • Hypertension    • Joint pain    • Knee pain    • Prostate cancer    • TIA (transient ischemic attack)    • Urinary frequency        Past Surgical History:   Procedure Laterality Date   • APPENDECTOMY     • BLADDER SURGERY     • CATARACT EXTRACTION     • COLON SURGERY     • PROSTATE SURGERY     • THROMBOENDARTERECTOMY         Social History     Social History   • Marital status:      Spouse name: N/A   • Number of children:  "N/A   • Years of education: N/A     Occupational History   • Not on file.     Social History Main Topics   • Smoking status: Former Smoker   • Smokeless tobacco: Never Used      Comment: smoked a pipe daily for 50 yrs, quit 30 yrs ago   • Alcohol use No   • Drug use: No   • Sexual activity: Defer     Other Topics Concern   • Not on file     Social History Narrative    Patient consumes 3 serving of caffeine daily.     Patient lives at home with wife.            Review of Systems    Objective   Blood pressure 130/68, height 70\" (177.8 cm), weight 165 lb (74.8 kg).    Physical Exam   Constitutional: He is oriented to person, place, and time.   Eyes: EOM are normal. Pupils are equal, round, and reactive to light.   Neurological: He is oriented to person, place, and time. He has a normal Finger-Nose-Finger Test and a normal Heel to Shin Test. Gait normal.   Psychiatric: His speech is normal.       Neurologic Exam     Mental Status   Oriented to person, place, and time.   Speech: speech is normal   Level of consciousness: alert  Knowledge: consistent with education.   Able to name object. Normal comprehension.        MMSE 29/30      Cranial Nerves     CN II   Visual fields full to confrontation.     CN III, IV, VI   Pupils are equal, round, and reactive to light.  Extraocular motions are normal.     CN VII   Facial expression full, symmetric.     CN IX, X   CN IX normal.   CN X normal.   Palate: symmetric    CN XI   CN XI normal.     CN XII   CN XII normal.     Motor Exam   Muscle bulk: normal  Right arm pronator drift: absent  Left arm pronator drift: absent    Strength   Strength 5/5 except as noted.     Sensory Exam   Light touch normal.     Gait, Coordination, and Reflexes     Gait  Gait: normal    Coordination   Finger to nose coordination: normal  Heel to shin coordination: normal    Tremor   Resting tremor: absent  Intention tremor: absent  Action tremor: absent      Assessment/Plan     Kareem was seen today for " mild cognitive disorder.    Diagnoses and all orders for this visit:    Mild cognitive disorder    Discussion/Summary:  Pt does well on testing, but problems at home suggest some slight progression, although mild. Answered questions regarding prognosis, behavioral tx, etc. Discussed medication, and what it can and cannot do. Will also get [facility] to contact them for further needs.   25 minutes face to face, 15 min in discussion as above.  Return in about 6 months (around 5/15/2018).

## 2017-11-16 ENCOUNTER — HOSPITAL ENCOUNTER (OUTPATIENT)
Dept: GENERAL RADIOLOGY | Facility: HOSPITAL | Age: 82
Discharge: HOME | End: 2017-11-16
Attending: INTERNAL MEDICINE | Admitting: INTERNAL MEDICINE

## 2017-11-16 ENCOUNTER — OFFICE VISIT (OUTPATIENT)
Dept: INTERNAL MEDICINE | Facility: CLINIC | Age: 82
End: 2017-11-16

## 2017-11-16 VITALS
SYSTOLIC BLOOD PRESSURE: 122 MMHG | TEMPERATURE: 98.4 F | BODY MASS INDEX: 23.1 KG/M2 | HEART RATE: 68 BPM | WEIGHT: 161 LBS | RESPIRATION RATE: 18 BRPM | DIASTOLIC BLOOD PRESSURE: 58 MMHG

## 2017-11-16 DIAGNOSIS — R05.9 COUGH: ICD-10-CM

## 2017-11-16 DIAGNOSIS — J18.9 PNEUMONIA OF RIGHT MIDDLE LOBE DUE TO INFECTIOUS ORGANISM: Primary | ICD-10-CM

## 2017-11-16 PROCEDURE — 99214 OFFICE O/P EST MOD 30 MIN: CPT | Performed by: INTERNAL MEDICINE

## 2017-11-16 PROCEDURE — 71020 HC CHEST PA AND LATERAL: CPT

## 2017-11-16 RX ORDER — LEVOFLOXACIN 500 MG/1
500 TABLET, FILM COATED ORAL DAILY
Qty: 10 TABLET | Refills: 0 | Status: SHIPPED | OUTPATIENT
Start: 2017-11-16 | End: 2017-01-01

## 2017-11-16 RX ORDER — BENZONATATE 200 MG/1
200 CAPSULE ORAL 3 TIMES DAILY PRN
Qty: 30 CAPSULE | Refills: 0 | Status: SHIPPED | OUTPATIENT
Start: 2017-11-16 | End: 2017-12-14 | Stop reason: SDUPTHER

## 2017-11-19 NOTE — PROGRESS NOTES
Chief Complaint   Patient presents with   • Cough     X 2 WEEKS       History of Present Illness    The patient presents with a 2 week history of a productive cough. The sputum is thick and yellow. The cough is worse at night. There are no other associated symptoms, including wheezing, fever, facial pain, a headache, eye drainage, ear pain, ear drainage, nasal congestion, rhinorrhea, nausea, vomiting, diarrhea, chills, decreased appetite, abdominal pain, sore throat, myalgias or a rash. The patient has not had hemoptysis. The patient is not exposed to cigarette smoke. The patient has not tried anything for treatment of this illness.     Review of Systems    GENERAL- Denies Unexplained Weight Loss, Sweats, Fatigue, Weakness or Malaise.    PULMONARY- Reports: Sputum Production and Cough. Denies: Dyspnea or Pleuritic Chest Pain.    Medications      Current Outpatient Prescriptions:   •  amLODIPine (NORVASC) 5 MG tablet, TAKE ONE TABLET BY MOUTH DAILY, Disp: 30 tablet, Rfl: 5  •  aspirin 325 MG tablet, Take 1 tablet by mouth daily., Disp: , Rfl:   •  Cetirizine HCl (ZYRTEC ALLERGY PO), Take 10 mg by mouth Daily., Disp: , Rfl:   •  donepezil (ARICEPT) 10 MG tablet, Take 1 tablet by mouth Daily., Disp: 90 tablet, Rfl: 3  •  famotidine (PEPCID) 20 MG tablet, Take 1 tablet by mouth 2 (Two) Times a Day., Disp: 60 tablet, Rfl: 5  •  furosemide (LASIX) 20 MG tablet, 1 tablet tablet M,W,F., Disp: 20 tablet, Rfl: 3  •  hydrALAZINE (APRESOLINE) 10 MG tablet, TAKE ONE TABLET BY MOUTH THREE TIMES A DAY, Disp: 90 tablet, Rfl: 4  •  Multiple Vitamins-Minerals (EYE VITAMINS) capsule, Take 1 capsule by mouth daily., Disp: , Rfl:   •  oxybutynin XL (DITROPAN XL) 15 MG 24 hr tablet, Take 15 mg by mouth Daily., Disp: , Rfl:   •  Probiotic Product (PROBIOTIC ADVANCED PO), Take 1 capsule by mouth Daily., Disp: , Rfl:   •  tamsulosin (FLOMAX) 0.4 MG capsule 24 hr capsule, Take 1 capsule by mouth Daily., Disp: , Rfl:   •  benzonatate  (TESSALON) 200 MG capsule, Take 1 capsule by mouth 3 (Three) Times a Day As Needed for Cough., Disp: 30 capsule, Rfl: 0  •  levoFLOXacin (LEVAQUIN) 500 MG tablet, Take 1 tablet by mouth Daily., Disp: 10 tablet, Rfl: 0     Allergies    Allergies   Allergen Reactions   • Atorvastatin Myalgia       Problem List    Patient Active Problem List   Diagnosis   • Anemia   • Abdominal aortic aneurysm   • Benign prostatic hyperplasia   • Gastroesophageal reflux disease   • Unsteady gait   • Hyperlipidemia   • Hypertension   • Mild cognitive disorder   • Cerebrovascular accident   • Bradycardia   • Constipation   • Laceration of finger of right hand   • Chronic midline low back pain without sciatica   • Closed compression fracture of thoracic vertebra   • Right-sided carotid artery disease   • Gynecomastia   • Memory loss   • Altered mental state   • Small bowel obstruction   • Deafness   • Prostate CA   • Atrial flutter   • AV heart block   • Localized edema   • CKD (chronic kidney disease)       Medications, Allergies, Problems List and Past History were reviewed and updated.    Physical Examination    /58 (BP Location: Left arm, Patient Position: Sitting, Cuff Size: Adult)  Pulse 68  Temp 98.4 °F (36.9 °C) (Temporal Artery )   Resp 18  Wt 161 lb (73 kg)  BMI 23.1 kg/m2    HEENT: Head- Normocephalic Atraumatic. Facies- Within normal limits. Pinnas- Normal texture and shape bilaterally. Canals- Normal bilaterally. TMs- Normal bilaterally. Nares- Patent bilaterally. Nasal Septum- is normal. There is no tenderness to palpation over the frontal or maxillary sinuses. Lids- Normal bilaterally. Conjunctiva- Clear bilaterally. Sclera- Anicteric bilaterally. Oropharynx- Moist with no lesions. Tonsils- No enlargement, erythema or exudate.    Neck: Thyroid- non enlarged, symmetric and has no nodules. No bruits are detected. ROM- Normal Range of Motion with no rigidity.    Lymph Nodes: Cervical- no enlarged lymph nodes noted.  Clavicular- Deferred. Axillary- Deferred. Inguinal- Deferred.    Lungs: Auscultation- Left lung clear; The right lung has basilar rales. There are no retractions, clubbing or cyanosis. The Expiratory to Inspiratory ratio is equal.    Cardiovascular: There are no carotid bruits. Heart- Normal Rate with Regular rhythm and no murmurs. There are no gallops. There are no rubs. In the lower extremities there is no edema. The upper extremities do not have edema.    Abdomen: Soft, benign, non-tender with no masses, hernias, organomegaly or scars.    Radiology    My personal interpretation of the x-rays is as stated below:    Chest X-ray (PA and Lateral) 16-Nov-2017 : The CXR reveals a normal cardiac silhoutte, a normal hilum, a normal mediastinum and an infiltrate and no other abnormalities. Infiltrates are seen in the right middle lobe.    Impression and Assessment    Pneumonia.    Plan    Pneumonia Plan: A cool mist humidifier was encouraged. He should drink plenty of fluid. Medication was added as noted.    Kareem was seen today for cough.    Diagnoses and all orders for this visit:    Pneumonia of right middle lobe due to infectious organism  -     XR Chest PA & Lateral; Future  -     levoFLOXacin (LEVAQUIN) 500 MG tablet; Take 1 tablet by mouth Daily.  -     benzonatate (TESSALON) 200 MG capsule; Take 1 capsule by mouth 3 (Three) Times a Day As Needed for Cough.        Return to Office    The patient was instructed to return for follow-up at the next scheduled visit.    The patient was instructed to return sooner if the condition changes, worsens, or doesn't resolve.

## 2017-11-21 ENCOUNTER — TELEPHONE (OUTPATIENT)
Dept: NEUROLOGY | Facility: CLINIC | Age: 82
End: 2017-11-21

## 2017-11-21 NOTE — TELEPHONE ENCOUNTER
Phone call to  And Mrs. Steiner to discuss disease education and assess support services. Mrs. Steiner put daughter Omaira on the phone. She said they have no needs at this time. She comes by daily to check in. He has pneumonia right now but recovering. She said they have advance directives as POA. She said she has my business card and will call me if has any questions or needs.

## 2017-12-04 DIAGNOSIS — J18.9 PNEUMONIA OF RIGHT MIDDLE LOBE DUE TO INFECTIOUS ORGANISM: ICD-10-CM

## 2017-12-05 RX ORDER — LEVOFLOXACIN 500 MG/1
TABLET, FILM COATED ORAL
Qty: 10 TABLET | Refills: 0 | OUTPATIENT
Start: 2017-12-05

## 2017-12-14 DIAGNOSIS — J18.9 PNEUMONIA OF RIGHT MIDDLE LOBE DUE TO INFECTIOUS ORGANISM: ICD-10-CM

## 2017-12-14 RX ORDER — BENZONATATE 200 MG/1
CAPSULE ORAL
Qty: 30 CAPSULE | Refills: 0 | Status: SHIPPED | OUTPATIENT
Start: 2017-12-14 | End: 2018-01-01 | Stop reason: SDUPTHER

## 2017-12-27 NOTE — PROGRESS NOTES
"[facility]      Encounter Date:12/27/2017     Kareem Steiner  [address]  [place] [address]  [phone]    5/3/1924    Eloy Aguilar MD    Kareem Steiner is a 89 y.o. male.      Subjective:     Chief Complaint:  Follow-up HTN, edema, paroxysmal atrial flutter       HPI   Mr. Steiner presents to the [facility] to follow up. He reports that he has been doing well and has had improvement in his edema. \" I am able to get my shoes on now\".  Patient denies chest pain, chest pressure, palpitations, tachycardia, dyspnea, presyncope, syncope, orthopnea, PND, abdominal fullness, early satiety, claudication, cough or edema.    Patient Active Problem List   Diagnosis   • Anemia   • Abdominal aortic aneurysm   • Benign prostatic hyperplasia   • Gastroesophageal reflux disease   • Unsteady gait   • Hyperlipidemia   • Hypertension   • Mild cognitive disorder   • Cerebrovascular accident   • Bradycardia   • Constipation   • Laceration of finger of right hand   • Chronic midline low back pain without sciatica   • Closed compression fracture of thoracic vertebra   • Right-sided carotid artery disease   • Gynecomastia   • Memory loss   • Altered mental state   • Small bowel obstruction   • Deafness   • Prostate CA   • Atrial flutter   • AV heart block   • Localized edema   • CKD (chronic kidney disease)         Past Surgical History:   Procedure Laterality Date   • APPENDECTOMY     • BLADDER SURGERY     • CATARACT EXTRACTION     • COLON SURGERY     • PROSTATE SURGERY     • THROMBOENDARTERECTOMY         Allergies   Allergen Reactions   • Atorvastatin Myalgia         Current Outpatient Prescriptions:   •  amLODIPine (NORVASC) 5 MG tablet, TAKE ONE TABLET BY MOUTH DAILY, Disp: 30 tablet, Rfl: 5  •  aspirin 325 MG tablet, Take 1 tablet by mouth daily., Disp: , Rfl:   •  benzonatate (TESSALON) 200 MG capsule, TAKE ONE CAPSULE BY MOUTH THREE TIMES A DAY AS NEEDED FOR COUGH, " Disp: 30 capsule, Rfl: 0  •  Cetirizine HCl (ZYRTEC ALLERGY PO), Take 10 mg by mouth Daily., Disp: , Rfl:   •  donepezil (ARICEPT) 10 MG tablet, Take 1 tablet by mouth Daily., Disp: 90 tablet, Rfl: 3  •  famotidine (PEPCID) 20 MG tablet, Take 1 tablet by mouth 2 (Two) Times a Day., Disp: 60 tablet, Rfl: 5  •  furosemide (LASIX) 20 MG tablet, 1 tablet tablet M,W,F., Disp: 20 tablet, Rfl: 3  •  hydrALAZINE (APRESOLINE) 10 MG tablet, TAKE ONE TABLET BY MOUTH THREE TIMES A DAY, Disp: 90 tablet, Rfl: 4  •  Multiple Vitamins-Minerals (CENTRUM SILVER PO), Take  by mouth Daily., Disp: , Rfl:   •  Multiple Vitamins-Minerals (EYE VITAMINS) capsule, Take 1 capsule by mouth daily., Disp: , Rfl:   •  oxybutynin XL (DITROPAN XL) 15 MG 24 hr tablet, Take 15 mg by mouth Daily., Disp: , Rfl:   •  Probiotic Product (PROBIOTIC ADVANCED PO), Take 1 capsule by mouth Daily., Disp: , Rfl:   •  tamsulosin (FLOMAX) 0.4 MG capsule 24 hr capsule, Take 1 capsule by mouth Daily., Disp: , Rfl:     The following portions of the patient's history were reviewed and updated as appropriate: allergies, current medications, past family history, past medical history, past social history, past surgical history and problem list.    Review of Systems   Constitution: Positive for malaise/fatigue. Negative for chills, decreased appetite, diaphoresis, fever, weakness, night sweats, weight gain and weight loss.   HENT: Positive for hearing loss. Negative for congestion and nosebleeds.         Postnasal drip   Eyes: Negative for blurred vision, visual disturbance and visual halos.   Cardiovascular: Positive for claudication and leg swelling. Negative for chest pain, cyanosis, dyspnea on exertion, irregular heartbeat, near-syncope, orthopnea, palpitations, paroxysmal nocturnal dyspnea and syncope.   Respiratory: Positive for cough, snoring and sputum production. Negative for hemoptysis, shortness of breath, sleep disturbances due to breathing and wheezing.   "  Endocrine: Positive for polyuria. Negative for cold intolerance, heat intolerance, polydipsia and polyphagia.   Hematologic/Lymphatic: Does not bruise/bleed easily.   Skin: Negative for dry skin, itching and rash.   Musculoskeletal: Positive for muscle cramps. Negative for falls, joint pain, joint swelling, muscle weakness and myalgias.   Gastrointestinal: Positive for constipation and diarrhea. Negative for bloating, abdominal pain, dysphagia, heartburn, melena, nausea and vomiting.   Genitourinary: Positive for nocturia. Negative for dysuria, flank pain and hematuria.   Neurological: Positive for excessive daytime sleepiness. Negative for difficulty with concentration, dizziness, headaches and loss of balance.   Psychiatric/Behavioral: Positive for memory loss. Negative for altered mental status and depression. The patient has insomnia. The patient is not nervous/anxious.    Allergic/Immunologic: Negative for environmental allergies.        Seasonal allergies       Objective:     Vitals:    12/27/17 1344 12/27/17 1345 12/27/17 1347   BP: 124/73 126/69 116/68   BP Location: Right arm Left arm Left arm   Patient Position: Sitting Sitting Standing   Cuff Size: Adult     Pulse: 62 65 68   Resp: 19     Temp: 97 °F (36.1 °C)     TempSrc: Temporal Artery      SpO2: 98%     Weight: 74.4 kg (164 lb)     Height: 180.3 cm (71\")           Physical Exam   Constitutional: He is oriented to person, place, and time. He appears well-developed and well-nourished. No distress.   HENT:   Head: Normocephalic and atraumatic.   Mouth/Throat: Oropharynx is clear and moist.   Eyes: Conjunctivae are normal. Pupils are equal, round, and reactive to light. No scleral icterus.   Neck: No hepatojugular reflux and no JVD present. Carotid bruit is not present. No tracheal deviation present. No thyromegaly present.   Cardiovascular: Normal heart sounds and intact distal pulses.  An irregular rhythm present. Exam reveals no friction rub.    No " murmur heard.  Pulmonary/Chest: Effort normal and breath sounds normal.   Abdominal: Soft. Bowel sounds are normal. He exhibits no distension. There is no tenderness.   Musculoskeletal: He exhibits edema (1+ right leg, 2+ left leg).   Lymphadenopathy:     He has no cervical adenopathy.   Neurological: He is alert and oriented to person, place, and time.   Skin: Skin is warm, dry and intact. No rash noted. No cyanosis or erythema. No pallor.   Psychiatric: He has a normal mood and affect. His behavior is normal. Thought content normal.   Vitals reviewed.      Lab and Diagnostic Review:  Results for orders placed or performed in visit on 12/27/17   Basic Metabolic Panel   Result Value Ref Range    Glucose 93 70 - 100 mg/dL    BUN 40 (H) 9 - 23 mg/dL    Creatinine 2.10 (H) 0.60 - 1.30 mg/dL    Sodium 143 132 - 146 mmol/L    Potassium 5.0 3.5 - 5.5 mmol/L    Chloride 110 (H) 99 - 109 mmol/L    CO2 23.0 20.0 - 31.0 mmol/L    Calcium 8.5 (L) 8.7 - 10.4 mg/dL    eGFR Non African Amer 30 (L) >60 mL/min/1.73    BUN/Creatinine Ratio 19.0 7.0 - 25.0    Anion Gap 10.0 3.0 - 11.0 mmol/L     EKG today shows aflutter with 4:1 conduction  Assessment and Plan:         1. Atrial flutter, paroxysmal  - Rate controlled. No OAC due to frequent falls  - ECG 12 Lead; atrial flutter 68 bpm,     Asymptomatic    2. Essential hypertension  - Controlled    3. Edema, unspecified type  - Stable    4. Stage 3 chronic kidney disease  - Basic Metabolic Panel    It has been a pleasure to participate in the care of this patient.  Patient was instructed to call the [facility] with any questions, concerns, or worsening symptoms.      *Please note that portions of this note were completed with a voice recognition program. Efforts were made to edit the dictations, but occasionally words are mistranscribed.

## 2018-01-01 ENCOUNTER — TELEPHONE (OUTPATIENT)
Dept: INTERNAL MEDICINE | Facility: CLINIC | Age: 83
End: 2018-01-01

## 2018-01-01 ENCOUNTER — HOSPITAL ENCOUNTER (OUTPATIENT)
Dept: GENERAL RADIOLOGY | Facility: HOSPITAL | Age: 83
Discharge: HOME | End: 2018-11-01
Attending: INTERNAL MEDICINE | Admitting: INTERNAL MEDICINE

## 2018-01-01 ENCOUNTER — HOSPITAL ENCOUNTER (OUTPATIENT)
Dept: GENERAL RADIOLOGY | Facility: HOSPITAL | Age: 83
Discharge: HOME | End: 2018-09-14
Admitting: PHYSICIAN ASSISTANT

## 2018-01-01 ENCOUNTER — LAB (OUTPATIENT)
Dept: LAB | Facility: HOSPITAL | Age: 83
End: 2018-01-01

## 2018-01-01 ENCOUNTER — LAB (OUTPATIENT)
Dept: INTERNAL MEDICINE | Facility: CLINIC | Age: 83
End: 2018-01-01

## 2018-01-01 ENCOUNTER — OFFICE VISIT (OUTPATIENT)
Dept: INTERNAL MEDICINE | Facility: CLINIC | Age: 83
End: 2018-01-01

## 2018-01-01 ENCOUNTER — HOSPITAL ENCOUNTER (OUTPATIENT)
Dept: CT IMAGING | Facility: HOSPITAL | Age: 83
Discharge: HOME | End: 2018-11-07
Attending: INTERNAL MEDICINE | Admitting: INTERNAL MEDICINE

## 2018-01-01 ENCOUNTER — TRANSCRIBE ORDERS (OUTPATIENT)
Dept: LAB | Facility: HOSPITAL | Age: 83
End: 2018-01-01

## 2018-01-01 ENCOUNTER — OFFICE VISIT (OUTPATIENT)
Dept: CARDIOLOGY | Facility: HOSPITAL | Age: 83
End: 2018-01-01

## 2018-01-01 ENCOUNTER — OFFICE VISIT (OUTPATIENT)
Dept: NEUROLOGY | Facility: CLINIC | Age: 83
End: 2018-01-01

## 2018-01-01 ENCOUNTER — TELEPHONE (OUTPATIENT)
Dept: CARDIOLOGY | Facility: HOSPITAL | Age: 83
End: 2018-01-01

## 2018-01-01 ENCOUNTER — HOSPITAL ENCOUNTER (OUTPATIENT)
Dept: CARDIOLOGY | Facility: HOSPITAL | Age: 83
Discharge: HOME | End: 2018-04-11
Admitting: NURSE PRACTITIONER

## 2018-01-01 ENCOUNTER — HOSPITAL ENCOUNTER (OUTPATIENT)
Dept: CARDIOLOGY | Facility: HOSPITAL | Age: 83
Discharge: HOME | End: 2018-04-25
Admitting: NURSE PRACTITIONER

## 2018-01-01 ENCOUNTER — TELEPHONE (OUTPATIENT)
Dept: URGENT CARE | Facility: CLINIC | Age: 83
End: 2018-01-01

## 2018-01-01 ENCOUNTER — OFFICE VISIT (OUTPATIENT)
Dept: CARDIOLOGY | Facility: CLINIC | Age: 83
End: 2018-01-01

## 2018-01-01 ENCOUNTER — HOSPITAL ENCOUNTER (OUTPATIENT)
Dept: GENERAL RADIOLOGY | Facility: HOSPITAL | Age: 83
Discharge: HOME | End: 2018-07-11
Attending: INTERNAL MEDICINE | Admitting: INTERNAL MEDICINE

## 2018-01-01 VITALS
TEMPERATURE: 97.1 F | BODY MASS INDEX: 22.81 KG/M2 | OXYGEN SATURATION: 97 % | SYSTOLIC BLOOD PRESSURE: 108 MMHG | RESPIRATION RATE: 16 BRPM | HEART RATE: 46 BPM | WEIGHT: 159 LBS | DIASTOLIC BLOOD PRESSURE: 60 MMHG

## 2018-01-01 VITALS
SYSTOLIC BLOOD PRESSURE: 144 MMHG | HEART RATE: 60 BPM | BODY MASS INDEX: 21.21 KG/M2 | TEMPERATURE: 97.6 F | DIASTOLIC BLOOD PRESSURE: 58 MMHG | WEIGHT: 147.8 LBS | RESPIRATION RATE: 18 BRPM

## 2018-01-01 VITALS
RESPIRATION RATE: 12 BRPM | OXYGEN SATURATION: 94 % | HEIGHT: 70 IN | TEMPERATURE: 96.7 F | HEART RATE: 71 BPM | BODY MASS INDEX: 23.77 KG/M2 | WEIGHT: 166 LBS | DIASTOLIC BLOOD PRESSURE: 78 MMHG | SYSTOLIC BLOOD PRESSURE: 129 MMHG

## 2018-01-01 VITALS
SYSTOLIC BLOOD PRESSURE: 124 MMHG | RESPIRATION RATE: 18 BRPM | TEMPERATURE: 97.9 F | DIASTOLIC BLOOD PRESSURE: 58 MMHG | HEART RATE: 63 BPM | HEIGHT: 70 IN | OXYGEN SATURATION: 97 % | WEIGHT: 183 LBS | BODY MASS INDEX: 26.2 KG/M2

## 2018-01-01 VITALS
SYSTOLIC BLOOD PRESSURE: 110 MMHG | WEIGHT: 176 LBS | BODY MASS INDEX: 25.2 KG/M2 | DIASTOLIC BLOOD PRESSURE: 68 MMHG | HEIGHT: 70 IN

## 2018-01-01 VITALS
SYSTOLIC BLOOD PRESSURE: 144 MMHG | WEIGHT: 146 LBS | HEART RATE: 56 BPM | DIASTOLIC BLOOD PRESSURE: 60 MMHG | BODY MASS INDEX: 20.95 KG/M2 | RESPIRATION RATE: 20 BRPM | TEMPERATURE: 98 F

## 2018-01-01 VITALS
WEIGHT: 180 LBS | RESPIRATION RATE: 16 BRPM | HEIGHT: 70 IN | BODY MASS INDEX: 25.77 KG/M2 | DIASTOLIC BLOOD PRESSURE: 68 MMHG | HEART RATE: 66 BPM | TEMPERATURE: 97.4 F | SYSTOLIC BLOOD PRESSURE: 122 MMHG

## 2018-01-01 VITALS
HEART RATE: 55 BPM | HEIGHT: 70 IN | OXYGEN SATURATION: 98 % | SYSTOLIC BLOOD PRESSURE: 155 MMHG | DIASTOLIC BLOOD PRESSURE: 63 MMHG | TEMPERATURE: 97.9 F | WEIGHT: 181 LBS | BODY MASS INDEX: 25.91 KG/M2 | RESPIRATION RATE: 19 BRPM

## 2018-01-01 VITALS
SYSTOLIC BLOOD PRESSURE: 110 MMHG | WEIGHT: 152.6 LBS | HEIGHT: 70 IN | BODY MASS INDEX: 21.85 KG/M2 | DIASTOLIC BLOOD PRESSURE: 64 MMHG | HEART RATE: 78 BPM

## 2018-01-01 VITALS
DIASTOLIC BLOOD PRESSURE: 60 MMHG | HEIGHT: 70 IN | WEIGHT: 160.4 LBS | BODY MASS INDEX: 22.96 KG/M2 | SYSTOLIC BLOOD PRESSURE: 122 MMHG | HEART RATE: 54 BPM

## 2018-01-01 VITALS
HEART RATE: 56 BPM | SYSTOLIC BLOOD PRESSURE: 128 MMHG | RESPIRATION RATE: 20 BRPM | WEIGHT: 158 LBS | BODY MASS INDEX: 22.67 KG/M2 | DIASTOLIC BLOOD PRESSURE: 64 MMHG | TEMPERATURE: 97.8 F

## 2018-01-01 VITALS
RESPIRATION RATE: 18 BRPM | SYSTOLIC BLOOD PRESSURE: 132 MMHG | TEMPERATURE: 98.2 F | WEIGHT: 167 LBS | DIASTOLIC BLOOD PRESSURE: 64 MMHG | HEART RATE: 68 BPM | BODY MASS INDEX: 23.96 KG/M2

## 2018-01-01 VITALS
DIASTOLIC BLOOD PRESSURE: 60 MMHG | RESPIRATION RATE: 18 BRPM | WEIGHT: 170 LBS | SYSTOLIC BLOOD PRESSURE: 142 MMHG | TEMPERATURE: 97.9 F | BODY MASS INDEX: 24.39 KG/M2 | HEART RATE: 56 BPM

## 2018-01-01 VITALS
RESPIRATION RATE: 16 BRPM | HEIGHT: 70 IN | HEART RATE: 62 BPM | TEMPERATURE: 99.2 F | OXYGEN SATURATION: 95 % | BODY MASS INDEX: 25.22 KG/M2 | DIASTOLIC BLOOD PRESSURE: 78 MMHG | WEIGHT: 176.2 LBS | SYSTOLIC BLOOD PRESSURE: 147 MMHG

## 2018-01-01 VITALS
DIASTOLIC BLOOD PRESSURE: 68 MMHG | TEMPERATURE: 97.5 F | RESPIRATION RATE: 18 BRPM | SYSTOLIC BLOOD PRESSURE: 138 MMHG | BODY MASS INDEX: 24.25 KG/M2 | WEIGHT: 169 LBS | HEART RATE: 72 BPM

## 2018-01-01 DIAGNOSIS — I10 ESSENTIAL HYPERTENSION: ICD-10-CM

## 2018-01-01 DIAGNOSIS — N50.89 SCROTAL EDEMA: ICD-10-CM

## 2018-01-01 DIAGNOSIS — R19.7 DIARRHEA, UNSPECIFIED TYPE: ICD-10-CM

## 2018-01-01 DIAGNOSIS — K92.1 BLOOD IN STOOL: ICD-10-CM

## 2018-01-01 DIAGNOSIS — I48.92 ATRIAL FLUTTER, UNSPECIFIED TYPE (HCC): ICD-10-CM

## 2018-01-01 DIAGNOSIS — K59.00 CONSTIPATION, UNSPECIFIED CONSTIPATION TYPE: ICD-10-CM

## 2018-01-01 DIAGNOSIS — N18.30 STAGE 3 CHRONIC KIDNEY DISEASE (HCC): ICD-10-CM

## 2018-01-01 DIAGNOSIS — I71.40 ABDOMINAL AORTIC ANEURYSM (AAA) WITHOUT RUPTURE (HCC): Primary | ICD-10-CM

## 2018-01-01 DIAGNOSIS — I13.10 BENIGN HYPERTENSIVE HEART AND RENAL DISEASE: Primary | ICD-10-CM

## 2018-01-01 DIAGNOSIS — I10 ESSENTIAL HYPERTENSION: Primary | ICD-10-CM

## 2018-01-01 DIAGNOSIS — Z79.899 ON POTASSIUM WASTING DIURETIC THERAPY: ICD-10-CM

## 2018-01-01 DIAGNOSIS — E78.5 HYPERLIPIDEMIA, UNSPECIFIED HYPERLIPIDEMIA TYPE: ICD-10-CM

## 2018-01-01 DIAGNOSIS — R60.9 CHRONIC EDEMA: Primary | ICD-10-CM

## 2018-01-01 DIAGNOSIS — K21.9 GASTROESOPHAGEAL REFLUX DISEASE WITHOUT ESOPHAGITIS: Primary | ICD-10-CM

## 2018-01-01 DIAGNOSIS — R19.7 DIARRHEA, UNSPECIFIED TYPE: Primary | ICD-10-CM

## 2018-01-01 DIAGNOSIS — R06.02 SHORTNESS OF BREATH: ICD-10-CM

## 2018-01-01 DIAGNOSIS — R60.9 EDEMA, UNSPECIFIED TYPE: ICD-10-CM

## 2018-01-01 DIAGNOSIS — R06.02 SHORTNESS OF BREATH: Primary | ICD-10-CM

## 2018-01-01 DIAGNOSIS — R60.0 LOCALIZED EDEMA: Primary | ICD-10-CM

## 2018-01-01 DIAGNOSIS — R27.8 GAIT DYSPRAXIA: ICD-10-CM

## 2018-01-01 DIAGNOSIS — R00.1 BRADYCARDIA: ICD-10-CM

## 2018-01-01 DIAGNOSIS — E78.2 MIXED HYPERLIPIDEMIA: Primary | ICD-10-CM

## 2018-01-01 DIAGNOSIS — R26.81 UNSTEADY GAIT: Primary | ICD-10-CM

## 2018-01-01 DIAGNOSIS — I13.10 BENIGN HYPERTENSIVE HEART AND RENAL DISEASE: ICD-10-CM

## 2018-01-01 DIAGNOSIS — K64.9 HEMORRHOIDS, UNSPECIFIED HEMORRHOID TYPE: ICD-10-CM

## 2018-01-01 DIAGNOSIS — R60.0 LOCALIZED EDEMA: ICD-10-CM

## 2018-01-01 DIAGNOSIS — R19.00 ABDOMINAL MASS, UNSPECIFIED ABDOMINAL LOCATION: ICD-10-CM

## 2018-01-01 DIAGNOSIS — N18.30 STAGE 3 CHRONIC KIDNEY DISEASE (HCC): Primary | ICD-10-CM

## 2018-01-01 DIAGNOSIS — N18.30 CHRONIC KIDNEY DISEASE, STAGE III (MODERATE) (HCC): ICD-10-CM

## 2018-01-01 DIAGNOSIS — Z23 NEED FOR PNEUMOCOCCAL VACCINE: ICD-10-CM

## 2018-01-01 DIAGNOSIS — J18.9 PNEUMONIA OF RIGHT MIDDLE LOBE DUE TO INFECTIOUS ORGANISM: ICD-10-CM

## 2018-01-01 DIAGNOSIS — E78.2 MIXED HYPERLIPIDEMIA: ICD-10-CM

## 2018-01-01 DIAGNOSIS — I48.91 ATRIAL FIBRILLATION, UNSPECIFIED TYPE (HCC): ICD-10-CM

## 2018-01-01 DIAGNOSIS — F09 MILD COGNITIVE DISORDER: Primary | ICD-10-CM

## 2018-01-01 DIAGNOSIS — K21.9 GASTROESOPHAGEAL REFLUX DISEASE WITHOUT ESOPHAGITIS: ICD-10-CM

## 2018-01-01 DIAGNOSIS — R19.8 ALTERNATING CONSTIPATION AND DIARRHEA: Primary | ICD-10-CM

## 2018-01-01 DIAGNOSIS — R19.8 ALTERNATING CONSTIPATION AND DIARRHEA: ICD-10-CM

## 2018-01-01 DIAGNOSIS — C61 PROSTATE CA (HCC): ICD-10-CM

## 2018-01-01 DIAGNOSIS — C61 PROSTATE CA (HCC): Primary | ICD-10-CM

## 2018-01-01 DIAGNOSIS — L03.115 CELLULITIS OF RIGHT LOWER EXTREMITY: Primary | ICD-10-CM

## 2018-01-01 DIAGNOSIS — R60.9 CHRONIC EDEMA: ICD-10-CM

## 2018-01-01 DIAGNOSIS — N50.89 SCROTAL SWELLING: ICD-10-CM

## 2018-01-01 DIAGNOSIS — I77.9 RIGHT-SIDED CAROTID ARTERY DISEASE (HCC): ICD-10-CM

## 2018-01-01 LAB
ALBUMIN SERPL-MCNC: 3.5 G/DL (ref 3.2–4.8)
ALBUMIN SERPL-MCNC: 3.6 G/DL (ref 3.2–4.8)
ALBUMIN SERPL-MCNC: 3.7 G/DL (ref 3.2–4.8)
ALBUMIN SERPL-MCNC: 3.85 G/DL (ref 3.2–4.8)
ALBUMIN SERPL-MCNC: 3.9 G/DL (ref 3.2–4.8)
ALBUMIN SERPL-MCNC: 3.95 G/DL (ref 3.2–4.8)
ALBUMIN/GLOB SERPL: 1.4 G/DL (ref 1.5–2.5)
ALBUMIN/GLOB SERPL: 1.5 G/DL (ref 1.5–2.5)
ALBUMIN/GLOB SERPL: 1.5 G/DL (ref 1.5–2.5)
ALBUMIN/GLOB SERPL: 1.6 G/DL (ref 1.5–2.5)
ALBUMIN/GLOB SERPL: 1.9 G/DL (ref 1.5–2.5)
ALP SERPL-CCNC: 120 U/L (ref 25–100)
ALP SERPL-CCNC: 126 U/L (ref 25–100)
ALP SERPL-CCNC: 126 U/L (ref 25–100)
ALP SERPL-CCNC: 131 U/L (ref 25–100)
ALP SERPL-CCNC: 145 U/L (ref 25–100)
ALT SERPL W P-5'-P-CCNC: 15 U/L (ref 7–40)
ALT SERPL W P-5'-P-CCNC: 16 U/L (ref 7–40)
ALT SERPL W P-5'-P-CCNC: 16 U/L (ref 7–40)
ALT SERPL W P-5'-P-CCNC: 19 U/L (ref 7–40)
ALT SERPL W P-5'-P-CCNC: 25 U/L (ref 7–40)
ANION GAP SERPL CALCULATED.3IONS-SCNC: 11 MMOL/L (ref 3–11)
ANION GAP SERPL CALCULATED.3IONS-SCNC: 11 MMOL/L (ref 3–11)
ANION GAP SERPL CALCULATED.3IONS-SCNC: 6 MMOL/L (ref 3–11)
ANION GAP SERPL CALCULATED.3IONS-SCNC: 7 MMOL/L (ref 3–11)
ANION GAP SERPL CALCULATED.3IONS-SCNC: 8 MMOL/L (ref 3–11)
ANION GAP SERPL CALCULATED.3IONS-SCNC: 8 MMOL/L (ref 3–11)
ANION GAP SERPL CALCULATED.3IONS-SCNC: 9 MMOL/L (ref 3–11)
ARTICHOKE IGE QN: 92 MG/DL (ref 0–130)
AST SERPL-CCNC: 23 U/L (ref 0–33)
AST SERPL-CCNC: 24 U/L (ref 0–33)
AST SERPL-CCNC: 25 U/L (ref 0–33)
AST SERPL-CCNC: 29 U/L (ref 0–33)
AST SERPL-CCNC: 36 U/L (ref 0–33)
BACTERIA SPEC AEROBE CULT: NORMAL
BACTERIA STL CULT: NORMAL
BACTERIA STL CULT: NORMAL
BACTERIA UR QL AUTO: ABNORMAL /HPF
BASOPHILS # BLD AUTO: 0.03 10*3/MM3 (ref 0–0.2)
BASOPHILS # BLD AUTO: 0.05 10*3/MM3 (ref 0–0.2)
BASOPHILS NFR BLD AUTO: 0.5 % (ref 0–1)
BASOPHILS NFR BLD AUTO: 0.5 % (ref 0–1)
BILIRUB BLD-MCNC: NEGATIVE MG/DL
BILIRUB SERPL-MCNC: 0.3 MG/DL (ref 0.3–1.2)
BILIRUB SERPL-MCNC: 0.3 MG/DL (ref 0.3–1.2)
BILIRUB SERPL-MCNC: 0.4 MG/DL (ref 0.3–1.2)
BILIRUB SERPL-MCNC: 0.4 MG/DL (ref 0.3–1.2)
BILIRUB SERPL-MCNC: 0.5 MG/DL (ref 0.3–1.2)
BILIRUB UR QL STRIP: NEGATIVE
BNP SERPL-MCNC: 343 PG/ML (ref 0–100)
BUN BLD-MCNC: 41 MG/DL (ref 9–23)
BUN BLD-MCNC: 41 MG/DL (ref 9–23)
BUN BLD-MCNC: 44 MG/DL (ref 9–23)
BUN BLD-MCNC: 44 MG/DL (ref 9–23)
BUN BLD-MCNC: 45 MG/DL (ref 9–23)
BUN BLD-MCNC: 49 MG/DL (ref 9–23)
BUN BLD-MCNC: 56 MG/DL (ref 9–23)
BUN/CREAT SERPL: 17.2 (ref 7–25)
BUN/CREAT SERPL: 19.1 (ref 7–25)
BUN/CREAT SERPL: 19.3 (ref 7–25)
BUN/CREAT SERPL: 19.6 (ref 7–25)
BUN/CREAT SERPL: 20 (ref 7–25)
BUN/CREAT SERPL: 20 (ref 7–25)
BUN/CREAT SERPL: 21 (ref 7–25)
BUN/CREAT SERPL: 21.3 (ref 7–25)
BUN/CREAT SERPL: 22.5 (ref 7–25)
C DIFF TOX GENS STL QL NAA+PROBE: NOT DETECTED
CALCIUM SPEC-SCNC: 8.3 MG/DL (ref 8.7–10.4)
CALCIUM SPEC-SCNC: 8.4 MG/DL (ref 8.7–10.4)
CALCIUM SPEC-SCNC: 8.5 MG/DL (ref 8.7–10.4)
CALCIUM SPEC-SCNC: 8.5 MG/DL (ref 8.7–10.4)
CALCIUM SPEC-SCNC: 8.6 MG/DL (ref 8.7–10.4)
CALCIUM SPEC-SCNC: 8.7 MG/DL (ref 8.7–10.4)
CALCIUM SPEC-SCNC: 8.8 MG/DL (ref 8.7–10.4)
CALCIUM SPEC-SCNC: 9 MG/DL (ref 8.7–10.4)
CALCIUM SPEC-SCNC: 9.1 MG/DL (ref 8.7–10.4)
CHLORIDE SERPL-SCNC: 111 MMOL/L (ref 99–109)
CHLORIDE SERPL-SCNC: 111 MMOL/L (ref 99–109)
CHLORIDE SERPL-SCNC: 112 MMOL/L (ref 99–109)
CHLORIDE SERPL-SCNC: 114 MMOL/L (ref 99–109)
CHLORIDE SERPL-SCNC: 115 MMOL/L (ref 99–109)
CHOLEST SERPL-MCNC: 173 MG/DL (ref 0–200)
CLARITY UR: CLEAR
CLARITY, POC: CLEAR
CO2 SERPL-SCNC: 19 MMOL/L (ref 20–31)
CO2 SERPL-SCNC: 21 MMOL/L (ref 20–31)
CO2 SERPL-SCNC: 21 MMOL/L (ref 20–31)
CO2 SERPL-SCNC: 22 MMOL/L (ref 20–31)
CO2 SERPL-SCNC: 22 MMOL/L (ref 20–31)
CO2 SERPL-SCNC: 24 MMOL/L (ref 20–31)
CO2 SERPL-SCNC: 24 MMOL/L (ref 20–31)
CO2 SERPL-SCNC: 25 MMOL/L (ref 20–31)
CO2 SERPL-SCNC: 25 MMOL/L (ref 20–31)
COLOR UR: YELLOW
COLOR UR: YELLOW
CREAT BLD-MCNC: 2 MG/DL (ref 0.6–1.3)
CREAT BLD-MCNC: 2.1 MG/DL (ref 0.6–1.3)
CREAT BLD-MCNC: 2.12 MG/DL (ref 0.6–1.3)
CREAT BLD-MCNC: 2.15 MG/DL (ref 0.6–1.3)
CREAT BLD-MCNC: 2.2 MG/DL (ref 0.6–1.3)
CREAT BLD-MCNC: 2.3 MG/DL (ref 0.6–1.3)
CREAT BLD-MCNC: 2.3 MG/DL (ref 0.6–1.3)
CREAT BLD-MCNC: 2.61 MG/DL (ref 0.6–1.3)
CREAT BLD-MCNC: 2.8 MG/DL (ref 0.6–1.3)
DEPRECATED RDW RBC AUTO: 54 FL (ref 37–54)
DEPRECATED RDW RBC AUTO: 55.2 FL (ref 37–54)
DEPRECATED RDW RBC AUTO: 60.1 FL (ref 37–54)
EOSINOPHIL # BLD AUTO: 0.08 10*3/MM3 (ref 0–0.3)
EOSINOPHIL # BLD AUTO: 0.19 10*3/MM3 (ref 0–0.3)
EOSINOPHIL NFR BLD AUTO: 1.3 % (ref 0–3)
EOSINOPHIL NFR BLD AUTO: 1.7 % (ref 0–3)
ERYTHROCYTE [DISTWIDTH] IN BLOOD BY AUTOMATED COUNT: 15.7 % (ref 11.3–14.5)
ERYTHROCYTE [DISTWIDTH] IN BLOOD BY AUTOMATED COUNT: 15.9 % (ref 11.3–14.5)
ERYTHROCYTE [DISTWIDTH] IN BLOOD BY AUTOMATED COUNT: 17 % (ref 11.3–14.5)
EXPIRATION DATE: NORMAL
FERRITIN SERPL-MCNC: 76 NG/ML (ref 22–322)
GFR SERPL CREATININE-BSD FRML MDRD: 21 ML/MIN/1.73
GFR SERPL CREATININE-BSD FRML MDRD: 23 ML/MIN/1.73
GFR SERPL CREATININE-BSD FRML MDRD: 27 ML/MIN/1.73
GFR SERPL CREATININE-BSD FRML MDRD: 27 ML/MIN/1.73
GFR SERPL CREATININE-BSD FRML MDRD: 28 ML/MIN/1.73
GFR SERPL CREATININE-BSD FRML MDRD: 29 ML/MIN/1.73
GFR SERPL CREATININE-BSD FRML MDRD: 29 ML/MIN/1.73
GFR SERPL CREATININE-BSD FRML MDRD: 30 ML/MIN/1.73
GFR SERPL CREATININE-BSD FRML MDRD: 31 ML/MIN/1.73
GLOBULIN UR ELPH-MCNC: 1.9 GM/DL
GLOBULIN UR ELPH-MCNC: 2.4 GM/DL
GLOBULIN UR ELPH-MCNC: 2.5 GM/DL
GLOBULIN UR ELPH-MCNC: 2.6 GM/DL
GLOBULIN UR ELPH-MCNC: 2.7 GM/DL
GLUCOSE BLD-MCNC: 108 MG/DL (ref 70–100)
GLUCOSE BLD-MCNC: 84 MG/DL (ref 70–100)
GLUCOSE BLD-MCNC: 91 MG/DL (ref 70–100)
GLUCOSE BLD-MCNC: 92 MG/DL (ref 70–100)
GLUCOSE BLD-MCNC: 92 MG/DL (ref 70–100)
GLUCOSE BLD-MCNC: 93 MG/DL (ref 70–100)
GLUCOSE BLD-MCNC: 95 MG/DL (ref 70–100)
GLUCOSE UR STRIP-MCNC: NEGATIVE MG/DL
GLUCOSE UR STRIP-MCNC: NEGATIVE MG/DL
HCT VFR BLD AUTO: 32.9 % (ref 38.9–50.9)
HCT VFR BLD AUTO: 35.1 % (ref 38.9–50.9)
HCT VFR BLD AUTO: 35.7 % (ref 38.9–50.9)
HDLC SERPL-MCNC: 71 MG/DL (ref 40–60)
HGB BLD-MCNC: 10 G/DL (ref 13.1–17.5)
HGB BLD-MCNC: 10.8 G/DL (ref 13.1–17.5)
HGB BLD-MCNC: 10.8 G/DL (ref 13.1–17.5)
HGB UR QL STRIP.AUTO: NEGATIVE
HYALINE CASTS UR QL AUTO: ABNORMAL /LPF
IMM GRANULOCYTES # BLD: 0.03 10*3/MM3 (ref 0–0.03)
IMM GRANULOCYTES # BLD: 0.03 10*3/MM3 (ref 0–0.03)
IMM GRANULOCYTES NFR BLD: 0.3 % (ref 0–0.6)
IMM GRANULOCYTES NFR BLD: 0.5 % (ref 0–0.6)
IRON 24H UR-MRATE: 35 MCG/DL (ref 50–175)
IRON SATN MFR SERPL: 12 % (ref 20–50)
KETONES UR QL STRIP: NEGATIVE
KETONES UR QL: NEGATIVE
LEUKOCYTE EST, POC: NEGATIVE
LEUKOCYTE ESTERASE UR QL STRIP.AUTO: NEGATIVE
LYMPHOCYTES # BLD AUTO: 0.91 10*3/MM3 (ref 0.6–4.8)
LYMPHOCYTES # BLD AUTO: 0.94 10*3/MM3 (ref 0.6–4.8)
LYMPHOCYTES NFR BLD AUTO: 14.7 % (ref 24–44)
LYMPHOCYTES NFR BLD AUTO: 8.5 % (ref 24–44)
Lab: NORMAL
MCH RBC QN AUTO: 28.3 PG (ref 27–31)
MCH RBC QN AUTO: 28.7 PG (ref 27–31)
MCH RBC QN AUTO: 29.3 PG (ref 27–31)
MCHC RBC AUTO-ENTMCNC: 30.3 G/DL (ref 32–36)
MCHC RBC AUTO-ENTMCNC: 30.4 G/DL (ref 32–36)
MCHC RBC AUTO-ENTMCNC: 30.8 G/DL (ref 32–36)
MCV RBC AUTO: 93.2 FL (ref 80–99)
MCV RBC AUTO: 94.9 FL (ref 80–99)
MCV RBC AUTO: 95.4 FL (ref 80–99)
MONOCYTES # BLD AUTO: 0.7 10*3/MM3 (ref 0–1)
MONOCYTES # BLD AUTO: 1.46 10*3/MM3 (ref 0–1)
MONOCYTES NFR BLD AUTO: 11.3 % (ref 0–12)
MONOCYTES NFR BLD AUTO: 13.2 % (ref 0–12)
NEUTROPHILS # BLD AUTO: 4.46 10*3/MM3 (ref 1.5–8.3)
NEUTROPHILS # BLD AUTO: 8.38 10*3/MM3 (ref 1.5–8.3)
NEUTROPHILS NFR BLD AUTO: 71.7 % (ref 41–71)
NEUTROPHILS NFR BLD AUTO: 75.8 % (ref 41–71)
NITRITE UR QL STRIP: NEGATIVE
NITRITE UR-MCNC: NEGATIVE MG/ML
O+P SPEC MICRO: NORMAL
O+P SPEC MICRO: NORMAL
O+P STL TRI STN: NORMAL
O+P STL TRI STN: NORMAL
PH UR STRIP.AUTO: 6 [PH] (ref 5–8)
PH UR: 5 [PH] (ref 5–8)
PHOSPHATE SERPL-MCNC: 3.8 MG/DL (ref 2.4–5.1)
PLATELET # BLD AUTO: 194 10*3/MM3 (ref 150–450)
PLATELET # BLD AUTO: 228 10*3/MM3 (ref 150–450)
PLATELET # BLD AUTO: 259 10*3/MM3 (ref 150–450)
PMV BLD AUTO: 10.2 FL (ref 6–12)
PMV BLD AUTO: 11 FL (ref 6–12)
PMV BLD AUTO: 11.8 FL (ref 6–12)
POTASSIUM BLD-SCNC: 4.4 MMOL/L (ref 3.5–5.5)
POTASSIUM BLD-SCNC: 4.5 MMOL/L (ref 3.5–5.5)
POTASSIUM BLD-SCNC: 4.5 MMOL/L (ref 3.5–5.5)
POTASSIUM BLD-SCNC: 4.7 MMOL/L (ref 3.5–5.5)
POTASSIUM BLD-SCNC: 4.8 MMOL/L (ref 3.5–5.5)
POTASSIUM BLD-SCNC: 4.9 MMOL/L (ref 3.5–5.5)
POTASSIUM BLD-SCNC: 5 MMOL/L (ref 3.5–5.5)
POTASSIUM BLD-SCNC: 5.1 MMOL/L (ref 3.5–5.5)
POTASSIUM BLD-SCNC: 5.1 MMOL/L (ref 3.5–5.5)
PROT SERPL-MCNC: 5.6 G/DL (ref 5.7–8.2)
PROT SERPL-MCNC: 5.9 G/DL (ref 5.7–8.2)
PROT SERPL-MCNC: 6.2 G/DL (ref 5.7–8.2)
PROT SERPL-MCNC: 6.3 G/DL (ref 5.7–8.2)
PROT SERPL-MCNC: 6.6 G/DL (ref 5.7–8.2)
PROT UR QL STRIP: ABNORMAL
PROT UR STRIP-MCNC: NEGATIVE MG/DL
PSA SERPL-MCNC: >100 NG/ML (ref 0–4)
RBC # BLD AUTO: 3.53 10*6/MM3 (ref 4.2–5.76)
RBC # BLD AUTO: 3.68 10*6/MM3 (ref 4.2–5.76)
RBC # BLD AUTO: 3.76 10*6/MM3 (ref 4.2–5.76)
RBC # UR STRIP: NEGATIVE /UL
RBC # UR: ABNORMAL /HPF
REF LAB TEST METHOD: ABNORMAL
SODIUM BLD-SCNC: 139 MMOL/L (ref 132–146)
SODIUM BLD-SCNC: 142 MMOL/L (ref 132–146)
SODIUM BLD-SCNC: 143 MMOL/L (ref 132–146)
SODIUM BLD-SCNC: 143 MMOL/L (ref 132–146)
SODIUM BLD-SCNC: 144 MMOL/L (ref 132–146)
SODIUM BLD-SCNC: 144 MMOL/L (ref 132–146)
SODIUM BLD-SCNC: 145 MMOL/L (ref 132–146)
SODIUM BLD-SCNC: 145 MMOL/L (ref 132–146)
SODIUM BLD-SCNC: 147 MMOL/L (ref 132–146)
SP GR UR STRIP: 1.02 (ref 1–1.03)
SP GR UR: 1.01 (ref 1–1.03)
SQUAMOUS #/AREA URNS HPF: ABNORMAL /HPF
T4 FREE SERPL-MCNC: 1.02 NG/DL (ref 0.89–1.76)
TIBC SERPL-MCNC: 291 MCG/DL (ref 250–450)
TRIGL SERPL-MCNC: 66 MG/DL (ref 0–150)
TSH SERPL DL<=0.05 MIU/L-ACNC: 1.52 MIU/ML (ref 0.35–5.35)
TSH SERPL DL<=0.05 MIU/L-ACNC: 1.73 MIU/ML (ref 0.35–5.35)
UROBILINOGEN UR QL STRIP: ABNORMAL
UROBILINOGEN UR QL: NORMAL
WBC NRBC COR # BLD: 11.05 10*3/MM3 (ref 3.5–10.8)
WBC NRBC COR # BLD: 6.21 10*3/MM3 (ref 3.5–10.8)
WBC NRBC COR # BLD: 7.24 10*3/MM3 (ref 3.5–10.8)
WBC STL QL MICRO: NORMAL
WBC UR QL AUTO: ABNORMAL /HPF

## 2018-01-01 PROCEDURE — 87045 FECES CULTURE AEROBIC BACT: CPT | Performed by: PHYSICIAN ASSISTANT

## 2018-01-01 PROCEDURE — 80053 COMPREHEN METABOLIC PANEL: CPT | Performed by: INTERNAL MEDICINE

## 2018-01-01 PROCEDURE — 87046 STOOL CULTR AEROBIC BACT EA: CPT | Performed by: PHYSICIAN ASSISTANT

## 2018-01-01 PROCEDURE — 80053 COMPREHEN METABOLIC PANEL: CPT

## 2018-01-01 PROCEDURE — 84443 ASSAY THYROID STIM HORMONE: CPT | Performed by: INTERNAL MEDICINE

## 2018-01-01 PROCEDURE — 36415 COLL VENOUS BLD VENIPUNCTURE: CPT

## 2018-01-01 PROCEDURE — 82728 ASSAY OF FERRITIN: CPT

## 2018-01-01 PROCEDURE — 36415 COLL VENOUS BLD VENIPUNCTURE: CPT | Performed by: INTERNAL MEDICINE

## 2018-01-01 PROCEDURE — 80048 BASIC METABOLIC PNL TOTAL CA: CPT

## 2018-01-01 PROCEDURE — 81003 URINALYSIS AUTO W/O SCOPE: CPT | Performed by: INTERNAL MEDICINE

## 2018-01-01 PROCEDURE — 93010 ELECTROCARDIOGRAM REPORT: CPT | Performed by: INTERNAL MEDICINE

## 2018-01-01 PROCEDURE — 87177 OVA AND PARASITES SMEARS: CPT | Performed by: INTERNAL MEDICINE

## 2018-01-01 PROCEDURE — 99215 OFFICE O/P EST HI 40 MIN: CPT | Performed by: INTERNAL MEDICINE

## 2018-01-01 PROCEDURE — 80061 LIPID PANEL: CPT | Performed by: INTERNAL MEDICINE

## 2018-01-01 PROCEDURE — 87045 FECES CULTURE AEROBIC BACT: CPT | Performed by: INTERNAL MEDICINE

## 2018-01-01 PROCEDURE — 87077 CULTURE AEROBIC IDENTIFY: CPT | Performed by: FAMILY MEDICINE

## 2018-01-01 PROCEDURE — 87493 C DIFF AMPLIFIED PROBE: CPT | Performed by: PHYSICIAN ASSISTANT

## 2018-01-01 PROCEDURE — 93005 ELECTROCARDIOGRAM TRACING: CPT | Performed by: NURSE PRACTITIONER

## 2018-01-01 PROCEDURE — 87046 STOOL CULTR AEROBIC BACT EA: CPT | Performed by: INTERNAL MEDICINE

## 2018-01-01 PROCEDURE — 80048 BASIC METABOLIC PNL TOTAL CA: CPT | Performed by: NURSE PRACTITIONER

## 2018-01-01 PROCEDURE — 83540 ASSAY OF IRON: CPT

## 2018-01-01 PROCEDURE — 99214 OFFICE O/P EST MOD 30 MIN: CPT | Performed by: INTERNAL MEDICINE

## 2018-01-01 PROCEDURE — 87493 C DIFF AMPLIFIED PROBE: CPT | Performed by: INTERNAL MEDICINE

## 2018-01-01 PROCEDURE — 85027 COMPLETE CBC AUTOMATED: CPT

## 2018-01-01 PROCEDURE — 99214 OFFICE O/P EST MOD 30 MIN: CPT | Performed by: NURSE PRACTITIONER

## 2018-01-01 PROCEDURE — 87205 SMEAR GRAM STAIN: CPT | Performed by: INTERNAL MEDICINE

## 2018-01-01 PROCEDURE — 99212 OFFICE O/P EST SF 10 MIN: CPT | Performed by: INTERNAL MEDICINE

## 2018-01-01 PROCEDURE — G0009 ADMIN PNEUMOCOCCAL VACCINE: HCPCS | Performed by: INTERNAL MEDICINE

## 2018-01-01 PROCEDURE — 87209 SMEAR COMPLEX STAIN: CPT | Performed by: PHYSICIAN ASSISTANT

## 2018-01-01 PROCEDURE — 84153 ASSAY OF PSA TOTAL: CPT | Performed by: INTERNAL MEDICINE

## 2018-01-01 PROCEDURE — 90732 PPSV23 VACC 2 YRS+ SUBQ/IM: CPT | Performed by: INTERNAL MEDICINE

## 2018-01-01 PROCEDURE — 99214 OFFICE O/P EST MOD 30 MIN: CPT | Performed by: PSYCHIATRY & NEUROLOGY

## 2018-01-01 PROCEDURE — 74176 CT ABD & PELVIS W/O CONTRAST: CPT

## 2018-01-01 PROCEDURE — A9270 NON-COVERED ITEM OR SERVICE: HCPCS | Performed by: INTERNAL MEDICINE

## 2018-01-01 PROCEDURE — 74018 RADEX ABDOMEN 1 VIEW: CPT

## 2018-01-01 PROCEDURE — 87186 SC STD MICRODIL/AGAR DIL: CPT | Performed by: FAMILY MEDICINE

## 2018-01-01 PROCEDURE — 81001 URINALYSIS AUTO W/SCOPE: CPT

## 2018-01-01 PROCEDURE — 85025 COMPLETE CBC W/AUTO DIFF WBC: CPT

## 2018-01-01 PROCEDURE — 83880 ASSAY OF NATRIURETIC PEPTIDE: CPT

## 2018-01-01 PROCEDURE — 87205 SMEAR GRAM STAIN: CPT | Performed by: PHYSICIAN ASSISTANT

## 2018-01-01 PROCEDURE — 63710000001 BARIUM 2 % SUSPENSION: Performed by: INTERNAL MEDICINE

## 2018-01-01 PROCEDURE — 99213 OFFICE O/P EST LOW 20 MIN: CPT | Performed by: PHYSICIAN ASSISTANT

## 2018-01-01 PROCEDURE — 87177 OVA AND PARASITES SMEARS: CPT | Performed by: PHYSICIAN ASSISTANT

## 2018-01-01 PROCEDURE — 85025 COMPLETE CBC W/AUTO DIFF WBC: CPT | Performed by: INTERNAL MEDICINE

## 2018-01-01 PROCEDURE — 84439 ASSAY OF FREE THYROXINE: CPT | Performed by: INTERNAL MEDICINE

## 2018-01-01 PROCEDURE — 87070 CULTURE OTHR SPECIMN AEROBIC: CPT | Performed by: FAMILY MEDICINE

## 2018-01-01 PROCEDURE — 87147 CULTURE TYPE IMMUNOLOGIC: CPT | Performed by: FAMILY MEDICINE

## 2018-01-01 PROCEDURE — 80069 RENAL FUNCTION PANEL: CPT

## 2018-01-01 PROCEDURE — 87205 SMEAR GRAM STAIN: CPT | Performed by: FAMILY MEDICINE

## 2018-01-01 PROCEDURE — 87209 SMEAR COMPLEX STAIN: CPT | Performed by: INTERNAL MEDICINE

## 2018-01-01 PROCEDURE — 83550 IRON BINDING TEST: CPT

## 2018-01-01 RX ORDER — FUROSEMIDE 20 MG/1
TABLET ORAL
Qty: 12 TABLET | Refills: 2 | OUTPATIENT
Start: 2018-01-01

## 2018-01-01 RX ORDER — FAMOTIDINE 20 MG/1
TABLET, FILM COATED ORAL
Qty: 60 TABLET | Refills: 4 | Status: SHIPPED | OUTPATIENT
Start: 2018-01-01 | End: 2018-01-01 | Stop reason: SDUPTHER

## 2018-01-01 RX ORDER — CHOLESTYRAMINE LIGHT 4 G/5.7G
4 POWDER, FOR SUSPENSION ORAL 2 TIMES DAILY
Qty: 60 PACKET | Refills: 2 | Status: SHIPPED | OUTPATIENT
Start: 2018-01-01

## 2018-01-01 RX ORDER — BENZONATATE 200 MG/1
200 CAPSULE ORAL 3 TIMES DAILY PRN
Qty: 30 CAPSULE | Refills: 0 | Status: SHIPPED | OUTPATIENT
Start: 2018-01-01 | End: 2018-01-01

## 2018-01-01 RX ORDER — AMLODIPINE BESYLATE 5 MG/1
TABLET ORAL
Qty: 30 TABLET | Refills: 5 | Status: SHIPPED | OUTPATIENT
Start: 2018-01-01

## 2018-01-01 RX ORDER — DONEPEZIL HYDROCHLORIDE 10 MG/1
TABLET, FILM COATED ORAL
Qty: 30 TABLET | Refills: 1 | Status: SHIPPED | OUTPATIENT
Start: 2018-01-01 | End: 2018-01-01 | Stop reason: SDUPTHER

## 2018-01-01 RX ORDER — AMLODIPINE BESYLATE 5 MG/1
5 TABLET ORAL DAILY
Qty: 30 TABLET | Refills: 5 | Status: SHIPPED | OUTPATIENT
Start: 2018-01-01 | End: 2018-01-01 | Stop reason: SDUPTHER

## 2018-01-01 RX ORDER — HYDRALAZINE HYDROCHLORIDE 10 MG/1
TABLET, FILM COATED ORAL
Qty: 90 TABLET | Refills: 3 | Status: SHIPPED | OUTPATIENT
Start: 2018-01-01 | End: 2018-01-01 | Stop reason: SDUPTHER

## 2018-01-01 RX ORDER — POTASSIUM CHLORIDE 750 MG/1
10 TABLET, FILM COATED, EXTENDED RELEASE ORAL ONCE
Qty: 30 TABLET | Refills: 11 | Status: SHIPPED | OUTPATIENT
Start: 2018-01-01 | End: 2018-01-01

## 2018-01-01 RX ORDER — TORSEMIDE 20 MG/1
TABLET ORAL
Qty: 15 TABLET | Refills: 3 | Status: SHIPPED | OUTPATIENT
Start: 2018-01-01

## 2018-01-01 RX ORDER — DONEPEZIL HYDROCHLORIDE 10 MG/1
TABLET, FILM COATED ORAL
Qty: 30 TABLET | Refills: 2 | Status: SHIPPED | OUTPATIENT
Start: 2018-01-01 | End: 2018-01-01 | Stop reason: SDUPTHER

## 2018-01-01 RX ORDER — POTASSIUM CHLORIDE 750 MG/1
10 TABLET, FILM COATED, EXTENDED RELEASE ORAL AS NEEDED
Qty: 30 TABLET | Refills: 11 | Status: SHIPPED | OUTPATIENT
Start: 2018-01-01 | End: 2018-01-01 | Stop reason: SDUPTHER

## 2018-01-01 RX ORDER — TORSEMIDE 20 MG/1
20 TABLET ORAL DAILY
Qty: 30 TABLET | Refills: 3 | Status: SHIPPED | OUTPATIENT
Start: 2018-01-01 | End: 2018-01-01 | Stop reason: SDUPTHER

## 2018-01-01 RX ORDER — SULFAMETHOXAZOLE AND TRIMETHOPRIM 800; 160 MG/1; MG/1
1 TABLET ORAL 2 TIMES DAILY
Qty: 20 TABLET | Refills: 0 | Status: SHIPPED | OUTPATIENT
Start: 2018-01-01 | End: 2018-01-01

## 2018-01-01 RX ORDER — TORSEMIDE 20 MG/1
10 TABLET ORAL DAILY
Qty: 15 TABLET | Refills: 3 | Status: SHIPPED | OUTPATIENT
Start: 2018-01-01 | End: 2018-01-01 | Stop reason: SDUPTHER

## 2018-01-01 RX ORDER — HYDRALAZINE HYDROCHLORIDE 10 MG/1
TABLET, FILM COATED ORAL
Qty: 90 TABLET | Refills: 1 | Status: SHIPPED | OUTPATIENT
Start: 2018-01-01

## 2018-01-01 RX ORDER — DONEPEZIL HYDROCHLORIDE 10 MG/1
TABLET, FILM COATED ORAL
Qty: 30 TABLET | Refills: 0 | Status: SHIPPED | OUTPATIENT
Start: 2018-01-01

## 2018-01-01 RX ORDER — UBIDECARENONE 100 MG
100 CAPSULE ORAL DAILY
COMMUNITY

## 2018-01-01 RX ORDER — POTASSIUM CHLORIDE 750 MG/1
10 TABLET, FILM COATED, EXTENDED RELEASE ORAL DAILY
COMMUNITY

## 2018-01-01 RX ORDER — FAMOTIDINE 20 MG/1
TABLET, FILM COATED ORAL
Qty: 60 TABLET | Refills: 3 | Status: SHIPPED | OUTPATIENT
Start: 2018-01-01

## 2018-01-01 RX ORDER — FUROSEMIDE 20 MG/1
20 TABLET ORAL AS NEEDED
Qty: 30 TABLET | Refills: 11 | Status: SHIPPED | OUTPATIENT
Start: 2018-01-01 | End: 2018-01-01

## 2018-01-01 RX ORDER — HYDRALAZINE HYDROCHLORIDE 10 MG/1
TABLET, FILM COATED ORAL
Qty: 90 TABLET | Refills: 2 | Status: SHIPPED | OUTPATIENT
Start: 2018-01-01 | End: 2018-01-01 | Stop reason: SDUPTHER

## 2018-01-01 RX ORDER — FUROSEMIDE 20 MG/1
TABLET ORAL
Qty: 60 TABLET | Refills: 3 | Status: SHIPPED | OUTPATIENT
Start: 2018-01-01 | End: 2018-01-01

## 2018-01-01 RX ADMIN — BARIUM SULFATE 450 ML: 21 SUSPENSION ORAL at 13:25

## 2018-01-10 NOTE — TELEPHONE ENCOUNTER
Please call in tessalon perls 200 mg po TID prn cough.  #30  NR.  She can take this with OTC Delsym OR Robitussin DM OR Mucinex DM to help alleviate the cough.  If symptoms worsen, don't improve, develops a fever needs to be seen.   Eloy Aguilar MD  10:57 AM  01/10/18

## 2018-01-10 NOTE — TELEPHONE ENCOUNTER
----- Message from Karis Clement sent at 1/10/2018  9:55 AM EST -----  MORGAN [phone]  PT HAS A REALLY BAD COUGH , PT COUGHED ALL NIGHT AND [facility]

## 2018-01-10 NOTE — TELEPHONE ENCOUNTER
RX SENT VIA [facility].     S/W PT [facility], INFORMED OF RX SENT AND TO TAKE ALONG WITH DELSYM, OR ROBITUSSIN DM OR MUCINEX DM, STATES THEY HAVE MUCINEX DM AND WILL TAKE WITH THAT ONE.  INST TO CALL IF WORSENS OR DOESN'T IMPROVE AS WILL NEED TO BE SEEN. AGREED.

## 2018-02-07 NOTE — PROGRESS NOTES
"[facility]  Office visit  Kareem Steiner  5/3/1924  [phone]    VISIT DATE:  02/07/2018    PCP: Eloy Aguilar MD  [address] [place], [place] [id number]    CC:  Chief Complaint   Patient presents with   • Atrial Fibrillation       PROBLEM LIST:  Symptomatic paroxysmal atrial flutter - advance Vascor equal to 6  High risk for chronic anticoagulation  Chronic venous insufficiency  Peripheral vascular disease status post right carotid endarterectomy  History of TIA  Anemia of chronic disease  CKD stage III  High-grade AV block, asymptomatic    ASSESSMENT:   Diagnosis Plan   1. Abdominal aortic aneurysm (AAA) without rupture     2. Mixed hyperlipidemia     3. Essential hypertension     4. Right-sided carotid artery disease         PLAN:  Paroxysmal atrial flutter: Continue aspirin 325 mg by mouth daily for stroke prophylaxis, high risk for chronic anticoagulation due to frequent falls    Hypertension: Goal less than 150/90 mmHg.  Agree with current antihypertensive regimen    Sinus bradycardia: Not recommending pacemaker implantation unless he becomes symptomatic with his bradyarrhythmia.    Venous insufficiency: Patient was instructed to double up on his Lasix until he is back to his baseline.  Supplemental potassium given with extra doses of Lasix.    Follow-up in 6 months    Subjective  Still generalized frailty and intermittent mechanical falls.  Denies chest pain or palpitations.  Using a walker for ambulation.  Has had some increased lower extremity edema over the past week.  Denies increased sodium intake.  Denies presyncope or syncope.  No other acute issues.    PHYSICAL EXAMINATION:  Vitals:    02/07/18 1037   BP: 122/60   BP Location: Right arm   Patient Position: Sitting   Pulse: 54   Weight: 72.8 kg (160 lb 6.4 oz)   Height: 177.8 cm (70\")     General Appearance:    Alert, cooperative, no distress, appears stated age   Head:    Normocephalic, without " obvious abnormality, atraumatic   Eyes:    conjunctiva/corneas clear   Nose:   Nares normal, septum midline, mucosa normal, no drainage   Throat:   Lips, teeth and gums normal   Neck:   Supple, symmetrical, trachea midline, no carotid    bruit or JVD   Lungs:     Clear to auscultation bilaterally, respirations unlabored   Chest Wall:    No tenderness or deformity    Heart:    Regular rate and rhythm, S1 and S2 normal,2/6 early peaking systolic murmur right upper sternal border, rub   or gallop, normal carotid impulse bilaterally without bruit.   Abdomen:     Soft, non-tender   Extremities:   Extremities normal, atraumatic, no cyanosis, 3+ left lower extremity pitting edema, trivial 1+ right lower extremity pitting edema.     Pulses:   2+ and symmetric all extremities   Skin:   Skin color, texture, turgor normal, no rashes or lesions       Diagnostic Data:  Procedures  Lab Results   Component Value Date    TRIG 55 10/18/2017    HDL 72 (H) 10/18/2017    LDLDIRECT 92 10/18/2017     Lab Results   Component Value Date    GLUCOSE 91 01/03/2018    BUN 45 (H) 01/03/2018    CREATININE 2.00 (H) 01/03/2018     (H) 01/03/2018    K 4.7 01/03/2018     (H) 01/03/2018    CO2 24.0 01/03/2018     Lab Results   Component Value Date    HGBA1C 5.4 10/26/2014     Lab Results   Component Value Date    WBC 11.05 (H) 01/03/2018    HGB 10.8 (L) 01/03/2018    HCT 35.7 (L) 01/03/2018     01/03/2018       Allergies  Allergies   Allergen Reactions   • Atorvastatin Myalgia       Current Medications    Current Outpatient Prescriptions:   •  amLODIPine (NORVASC) 5 MG tablet, Take 1 tablet by mouth Daily., Disp: 30 tablet, Rfl: 5  •  aspirin 325 MG tablet, Take 1 tablet by mouth daily., Disp: , Rfl:   •  benzonatate (TESSALON) 200 MG capsule, Take 1 capsule by mouth 3 (Three) Times a Day As Needed for Cough., Disp: 30 capsule, Rfl: 0  •  Cetirizine HCl (ZYRTEC ALLERGY PO), Take 10 mg by mouth Daily., Disp: , Rfl:   •  donepezil  (ARICEPT) 10 MG tablet, Take 1 tablet by mouth Daily., Disp: 90 tablet, Rfl: 3  •  famotidine (PEPCID) 20 MG tablet, Take 1 tablet by mouth 2 (Two) Times a Day., Disp: 60 tablet, Rfl: 5  •  furosemide (LASIX) 20 MG tablet, 1 tablet tablet M,W,F., Disp: 20 tablet, Rfl: 3  •  hydrALAZINE (APRESOLINE) 10 MG tablet, TAKE ONE TABLET BY MOUTH THREE TIMES A DAY, Disp: 90 tablet, Rfl: 4  •  Iron, Ferrous Gluconate, 256 (28 Fe) MG tablet, Take  by mouth Daily., Disp: , Rfl:   •  Multiple Vitamins-Minerals (CENTRUM SILVER PO), Take  by mouth Daily., Disp: , Rfl:   •  Multiple Vitamins-Minerals (EYE VITAMINS) capsule, Take 1 capsule by mouth daily., Disp: , Rfl:   •  oxybutynin XL (DITROPAN XL) 15 MG 24 hr tablet, Take 15 mg by mouth Daily., Disp: , Rfl:   •  Probiotic Product (PROBIOTIC ADVANCED PO), Take 1 capsule by mouth Daily., Disp: , Rfl:   •  tamsulosin (FLOMAX) 0.4 MG capsule 24 hr capsule, Take 1 capsule by mouth Daily., Disp: , Rfl:           ROS  Review of Systems   Cardiovascular: Positive for leg swelling. Negative for chest pain and dyspnea on exertion.   Respiratory: Negative for cough and shortness of breath.          SOCIAL HX  Social History     Social History   • Marital status:      Spouse name: N/A   • Number of children: N/A   • Years of education: N/A     Occupational History   • Not on file.     Social History Main Topics   • Smoking status: Former Smoker   • Smokeless tobacco: Never Used      Comment: smoked a pipe daily for 50 yrs, quit 30 yrs ago   • Alcohol use No   • Drug use: No   • Sexual activity: Defer     Other Topics Concern   • Not on file     Social History Narrative    Patient consumes 3 serving of caffeine daily.     Patient lives at home with wife.            FAMILY HX  Family History   Problem Relation Age of Onset   • Diabetes Other    • Stroke Father    • Heart attack Father              Wil Gomez III, MD, [facility]

## 2018-03-05 NOTE — TELEPHONE ENCOUNTER
Refills sent on 2/7/18 for furosemide by Dr. Gomez with different instructions to take daily as needed. Refills for MWF dosing denied at this time.    Paty Rea, SarahD

## 2018-03-06 NOTE — PROGRESS NOTES
Chief Complaint   Patient presents with   • Follow-up     4 MONTH FOLLOW UP CHRONIC MEDICAL PROBLEMS       History of Present Illness      The patient presents for a follow-up related to hyperlipidemia. He is following a low fat diet. He reports that he is exercising. He is not taking medication for hyperlipidemia. He denies chest pain, shortness of breath, orthopnea, paroxysmal nocturnal dyspnea, dyspnea on exertion, edema, palpitations or syncope.    The patient presents for a follow-up related to hypertension. The patient reports that he has had no headaches or blurred vision. He states that he is taking his medication as prescribed. He is not having medication side effects. He does not check his blood pressures at home.    The patient is on Pepcid for his gastroesophageal reflux. The medication is taken on a regular basis and gives complete relief of the symptoms. He reports no abdominal pain, belching, diarrhea, dysphagia, early satiety, heartburn, hoarseness, nausea, odynophagia, rectal bleeding, vomiting or weight loss. The GERD has no known aggravating factors.    Review of Systems    GENERAL- Denies Fever, Chills, Sweats, Fatigue, Weakness or Malaise.    CARDIOVASCULAR- Denies Claudication.    GASTROINTESTINAL- Denies Constipation.    PULMONARY- Denies Wheezing, Sputum Production, Cough, Hemoptysis or Pleuritic Chest Pain.    Medications      Current Outpatient Prescriptions:   •  amLODIPine (NORVASC) 5 MG tablet, Take 1 tablet by mouth Daily., Disp: 30 tablet, Rfl: 5  •  aspirin 325 MG tablet, Take 1 tablet by mouth daily., Disp: , Rfl:   •  donepezil (ARICEPT) 10 MG tablet, Take 1 tablet by mouth Daily., Disp: 90 tablet, Rfl: 3  •  famotidine (PEPCID) 20 MG tablet, Take 1 tablet by mouth 2 (Two) Times a Day., Disp: 60 tablet, Rfl: 5  •  furosemide (LASIX) 20 MG tablet, 1 tablet tablet M,W,F., Disp: 20 tablet, Rfl: 3  •  hydrALAZINE (APRESOLINE) 10 MG tablet, TAKE ONE TABLET BY MOUTH THREE TIMES A DAY,  Disp: 90 tablet, Rfl: 3  •  Multiple Vitamins-Minerals (CENTRUM SILVER PO), Take  by mouth Daily., Disp: , Rfl:   •  Multiple Vitamins-Minerals (EYE VITAMINS) capsule, Take 1 capsule by mouth daily., Disp: , Rfl:   •  oxybutynin XL (DITROPAN XL) 15 MG 24 hr tablet, Take 15 mg by mouth Daily., Disp: , Rfl:   •  potassium chloride (K-DUR) 10 MEQ CR tablet, Take 1 tablet by mouth As Needed (with prn lasix)., Disp: 30 tablet, Rfl: 11  •  Probiotic Product (PROBIOTIC ADVANCED PO), Take 1 capsule by mouth Daily., Disp: , Rfl:   •  tamsulosin (FLOMAX) 0.4 MG capsule 24 hr capsule, Take 1 capsule by mouth Daily., Disp: , Rfl:   •  Cetirizine HCl (ZYRTEC ALLERGY PO), Take 10 mg by mouth Daily., Disp: , Rfl:   •  furosemide (LASIX) 20 MG tablet, Take 1 tablet by mouth As Needed (edema)., Disp: 30 tablet, Rfl: 11  •  Iron, Ferrous Gluconate, 256 (28 Fe) MG tablet, Take  by mouth Daily., Disp: , Rfl:      Allergies    Allergies   Allergen Reactions   • Atorvastatin Myalgia       Problem List    Patient Active Problem List   Diagnosis   • Anemia   • Abdominal aortic aneurysm   • Benign prostatic hyperplasia   • Gastroesophageal reflux disease   • Unsteady gait   • Hyperlipidemia   • Hypertension   • Mild cognitive disorder   • Cerebrovascular accident   • Bradycardia   • Constipation   • Laceration of finger of right hand   • Chronic midline low back pain without sciatica   • Closed compression fracture of thoracic vertebra   • Right-sided carotid artery disease   • Gynecomastia   • Memory loss   • Altered mental state   • Small bowel obstruction   • Deafness   • Prostate CA   • Atrial flutter   • AV heart block   • Localized edema   • CKD (chronic kidney disease)       Medications, Allergies, Problems List and Past History were reviewed and updated.    Physical Examination    /64 (BP Location: Left arm, Patient Position: Sitting, Cuff Size: Adult)  Pulse 68  Temp 98.2 °F (36.8 °C) (Temporal Artery )   Resp 18  Wt 75.8  kg (167 lb)  BMI 23.96 kg/m2    HEENT: Head- Normocephalic Atraumatic. Facies- Within normal limits. Pinnas- Normal texture and shape bilaterally. Canals- Normal bilaterally. TMs- Normal bilaterally. Nares- Patent bilaterally. Nasal Septum- is normal. There is no tenderness to palpation over the frontal or maxillary sinuses. Lids- Normal bilaterally. Conjunctiva- Clear bilaterally. Sclera- Anicteric bilaterally. Oropharynx- Moist with no lesions. Tonsils- No enlargement, erythema or exudate.    Neck: Thyroid- non enlarged, symmetric and has no nodules. No bruits are detected. ROM- Normal Range of Motion with no rigidity.    Lungs: Auscultation- Clear to auscultation bilaterally. There are no retractions, clubbing or cyanosis. The Expiratory to Inspiratory ratio is equal.    Cardiovascular: There are no carotid bruits. Heart- Normal Rate with Regular rhythm and no murmurs. There are no gallops. There are no rubs. In the lower extremities there is no edema. The upper extremities do not have edema.    Abdomen: Soft, benign, non-tender with no masses, hernias, organomegaly or scars.    Impression and Assessment    Encounter for Immunization Administration.    Hyperlipidemia.    Essential Hypertension.    Gastroesophageal Reflux Disease.    Plan    Gastroesophageal Reflux Disease Plan: The current plan was continued.    Hyperlipidemia Plan: He was instructed to eat a low fat diet. He was encouraged to exercise daily.    Essential Hypertension Plan: The current plan was continued.    Counseled regarding immunizations and applicable VIS given.    Immunizations Ordered and Administered: Pneumovax 23.    Kareem was seen today for follow-up.    Diagnoses and all orders for this visit:    Gastroesophageal reflux disease without esophagitis  -     Comprehensive Metabolic Panel    Essential hypertension  -     Comprehensive Metabolic Panel    Hyperlipidemia, unspecified hyperlipidemia type  -     Comprehensive Metabolic  Panel    Need for pneumococcal vaccine  -     Pneumococcal polysaccharide vaccine 23-valent >= 3yo subcutaneous/IM (PPSV23)        Return to Office    The patient was instructed to return for follow-up in 4 months.    The patient was instructed to return sooner if the condition changes, worsens, or doesn't resolve.

## 2018-04-11 NOTE — PROGRESS NOTES
[facility]  [facility]      Encounter Date:04/11/2018     Kareem Steiner  [id number]  [place] [address]  [phone]    5/3/1924    Eloy Aguilar MD    Kareem Steiner is a 89 y.o. male.      Subjective:     Chief Complaint:  Follow-up; Leg Swelling; and Shortness of Breath       HPI     History of atrial fibrillation/atrial flutter.  Only on aspirin due to high risk and frequent falls.  Patient presents today complaining of worsening lower extremity edema, scrotal swelling, weight gain over the last month.  Patient has not been weighing himself at home but feels that his pants are tight.  His appetite has not improved.  He is drinking less than 1500 mL of fluid per day.  He is taking Lasix daily.  He has noted increase dyspnea that is worse at night.  Denies chest pain, pressure.  He does not set with legs elevated and is not willing to wear compression stockings.  He is very immobile in general.    Patient Active Problem List    Diagnosis   • Localized edema [R60.0]   • CKD (chronic kidney disease) [N18.9]   • AV heart block [I44.30]   • Gynecomastia [N62]   • Memory loss [R41.3]   • Altered mental state [R41.82]   • Small bowel obstruction [K56.609]   • Deafness [H91.90]     Overview Note:     H/O     • Prostate CA [C61]     Overview Note:     h/o     • Atrial flutter [I48.92]     Overview Note:     · Cardiac event monitor 6/30/17: Nonsustained, rate controlled, asymptomatic atrial flutter.  1 episode 2-1 AV block and one episode of transient high-grade AV block.  Asymptomatic pars 4.1 seconds     • Right-sided carotid artery disease [I77.9]     Overview Note:     S/p Rt CEA 2014     • Closed compression fracture of thoracic vertebra [S22.000A]   • Laceration of finger of right hand [S61.219A]   • Chronic midline low back pain without sciatica [M54.5, G89.29]   • Anemia [D64.9]   • Abdominal aortic aneurysm [I71.4]   • Benign prostatic hyperplasia [N40.0]   •  Gastroesophageal reflux disease [K21.9]   • Unsteady gait [R26.81]   • Hyperlipidemia [E78.5]   • Hypertension [I10]   • Mild cognitive disorder [F09]     Overview Note:     MMSE=27 (0/3 Recall -- it is unclear whether the patient heard the instructions correctly due to his hearing loss) 10/20/15     • Cerebrovascular accident [I63.9]   • Bradycardia [R00.1]     Overview Note:     Echocardiogram 6/30/17: EF 60%, mild MR, mild TR, RVSP 35.6 mmHg     • Constipation [K59.00]         Past Surgical History:   Procedure Laterality Date   • APPENDECTOMY     • BLADDER SURGERY     • CATARACT EXTRACTION     • COLON SURGERY     • PROSTATE SURGERY     • THROMBOENDARTERECTOMY         Allergies   Allergen Reactions   • Atorvastatin Myalgia         Current Outpatient Prescriptions:   •  amLODIPine (NORVASC) 5 MG tablet, Take 1 tablet by mouth Daily., Disp: 30 tablet, Rfl: 5  •  aspirin 325 MG tablet, Take 1 tablet by mouth daily., Disp: , Rfl:   •  Cetirizine HCl (ZYRTEC ALLERGY PO), Take 10 mg by mouth Daily., Disp: , Rfl:   •  coenzyme Q10 100 MG capsule, Take 100 mg by mouth Daily., Disp: , Rfl:   •  donepezil (ARICEPT) 10 MG tablet, Take 1 tablet by mouth Daily., Disp: 90 tablet, Rfl: 3  •  famotidine (PEPCID) 20 MG tablet, TAKE ONE TABLET BY MOUTH TWICE A DAY, Disp: 60 tablet, Rfl: 4  •  hydrALAZINE (APRESOLINE) 10 MG tablet, TAKE ONE TABLET BY MOUTH THREE TIMES A DAY, Disp: 90 tablet, Rfl: 3  •  Multiple Vitamins-Minerals (CENTRUM SILVER PO), Take  by mouth Daily., Disp: , Rfl:   •  Multiple Vitamins-Minerals (EYE VITAMINS) capsule, Take 1 capsule by mouth daily., Disp: , Rfl:   •  potassium chloride (K-DUR) 10 MEQ CR tablet, Take 1 tablet by mouth As Needed (with prn lasix)., Disp: 30 tablet, Rfl: 11  •  Probiotic Product (PROBIOTIC ADVANCED PO), Take 1 capsule by mouth Daily., Disp: , Rfl:   •  tamsulosin (FLOMAX) 0.4 MG capsule 24 hr capsule, Take 1 capsule by mouth Daily., Disp: , Rfl:   •  furosemide (LASIX) 20 MG  tablet, Lasix 20 mg daily, alternating 20 mg twice a day every other day., Disp: 60 tablet, Rfl: 3  •  Iron, Ferrous Gluconate, 256 (28 Fe) MG tablet, Take  by mouth Daily., Disp: , Rfl:   •  oxybutynin XL (DITROPAN XL) 15 MG 24 hr tablet, Take 15 mg by mouth Daily., Disp: , Rfl:     The following portions of the patient's history were reviewed and updated as appropriate: allergies, current medications, past family history, past medical history, past social history, past surgical history and problem list.    Review of Systems   Constitution: Positive for weakness, malaise/fatigue and weight gain (15 lbs in 1 months). Negative for chills, decreased appetite, diaphoresis, fever, night sweats and weight loss.   HENT: Positive for congestion and hearing loss. Negative for nosebleeds.         Postnasal drip   Eyes: Negative for blurred vision, visual disturbance and visual halos.   Cardiovascular: Positive for leg swelling. Negative for chest pain, claudication, cyanosis, dyspnea on exertion, irregular heartbeat, near-syncope, orthopnea, palpitations, paroxysmal nocturnal dyspnea and syncope.   Respiratory: Positive for shortness of breath, sleep disturbances due to breathing and snoring. Negative for cough, hemoptysis, sputum production and wheezing.    Endocrine: Positive for cold intolerance and polyuria. Negative for heat intolerance, polydipsia and polyphagia.   Hematologic/Lymphatic: Bruises/bleeds easily.   Skin: Negative for dry skin, itching and rash.   Musculoskeletal: Positive for joint pain, muscle cramps and muscle weakness. Negative for falls, joint swelling and myalgias.   Gastrointestinal: Positive for bloating, constipation, diarrhea and heartburn. Negative for abdominal pain, dysphagia, melena, nausea and vomiting.   Genitourinary: Negative for dysuria, flank pain, hematuria and nocturia.   Neurological: Positive for excessive daytime sleepiness and loss of balance. Negative for difficulty with  "concentration, dizziness and headaches.   Psychiatric/Behavioral: Negative for altered mental status and depression. The patient has insomnia. The patient is not nervous/anxious.    Allergic/Immunologic: Negative for environmental allergies.       Objective:     Vitals:    04/11/18 1313 04/11/18 1316   BP: 170/77 155/63   BP Location: Right arm Left arm   Patient Position: Sitting Sitting   Cuff Size: Adult    Pulse: 59 55   Resp: 19    Temp: 97.9 °F (36.6 °C)    TempSrc: Temporal Artery     SpO2: 98%    Weight: 82.1 kg (181 lb)    Height: 177.8 cm (70\")          Physical Exam   Constitutional: He is oriented to person, place, and time. He appears well-developed and well-nourished. No distress.   HENT:   Head: Normocephalic and atraumatic.   Mouth/Throat: Oropharynx is clear and moist.   Eyes: Conjunctivae are normal. Pupils are equal, round, and reactive to light. No scleral icterus.   Neck: No hepatojugular reflux and no JVD present. Carotid bruit is not present. No tracheal deviation present. No thyromegaly present.   Cardiovascular: Normal rate, normal heart sounds and intact distal pulses.  An irregularly irregular rhythm present. Exam reveals no friction rub.    No murmur heard.  Pulmonary/Chest: He is in respiratory distress. He has rales.   Abdominal: Soft. Bowel sounds are normal. He exhibits no distension. There is no tenderness.   Musculoskeletal: He exhibits edema (2+ lower extremity edema.  Srotal swelling moderate.).   Lymphadenopathy:     He has no cervical adenopathy.   Neurological: He is alert and oriented to person, place, and time.   Skin: Skin is warm, dry and intact. No rash noted. No cyanosis or erythema. No pallor.   Psychiatric: He has a normal mood and affect. His behavior is normal. Thought content normal.   Vitals reviewed.      Lab and Diagnostic Review:  Results for orders placed or performed in visit on 03/06/18   Comprehensive Metabolic Panel   Result Value Ref Range    Glucose 92 70 " - 100 mg/dL    BUN 44 (H) 9 - 23 mg/dL    Creatinine 2.10 (H) 0.60 - 1.30 mg/dL    Sodium 144 132 - 146 mmol/L    Potassium 4.8 3.5 - 5.5 mmol/L    Chloride 111 (H) 99 - 109 mmol/L    CO2 25.0 20.0 - 31.0 mmol/L    Calcium 8.4 (L) 8.7 - 10.4 mg/dL    Total Protein 5.9 5.7 - 8.2 g/dL    Albumin 3.50 3.20 - 4.80 g/dL    ALT (SGPT) 15 7 - 40 U/L    AST (SGOT) 24 0 - 33 U/L    Alkaline Phosphatase 126 (H) 25 - 100 U/L    Total Bilirubin 0.3 0.3 - 1.2 mg/dL    eGFR Non African Amer 30 (L) >60 mL/min/1.73    Globulin 2.4 gm/dL    A/G Ratio 1.5 1.5 - 2.5 g/dL    BUN/Creatinine Ratio 21.0 7.0 - 25.0    Anion Gap 8.0 3.0 - 11.0 mmol/L     Today:  Glucose 70 - 100 mg/dL 92    BUN 9 - 23 mg/dL 44     Creatinine 0.60 - 1.30 mg/dL 2.20     Sodium 132 - 146 mmol/L 143    Potassium 3.5 - 5.5 mmol/L 5.1    Chloride 99 - 109 mmol/L 115     CO2 20.0 - 31.0 mmol/L 21.0    Calcium 8.7 - 10.4 mg/dL 8.7    Total Protein 5.7 - 8.2 g/dL 6.2    Albumin 3.20 - 4.80 g/dL 3.60    ALT (SGPT) 7 - 40 U/L 19    AST (SGOT) 0 - 33 U/L 23    Alkaline Phosphatase 25 - 100 U/L 126     Total Bilirubin 0.3 - 1.2 mg/dL 0.4    eGFR Non African Amer >60 mL/min/1.73 28     Globulin gm/dL 2.6    A/G Ratio 1.5 - 2.5 g/dL 1.4     BUN/Creatinine Ratio 7.0 - 25.0 20.0    Anion Gap 3.0 - 11.0 mmol/L 7.0      BNP 0.0 - 100.0 pg/mL 343.0       WBC 3.50 - 10.80 10*3/mm3 7.24    RBC 4.20 - 5.76 10*6/mm3 3.53     Hemoglobin 13.1 - 17.5 g/dL 10.0     Hematocrit 38.9 - 50.9 % 32.9     MCV 80.0 - 99.0 fL 93.2    MCH 27.0 - 31.0 pg 28.3    MCHC 32.0 - 36.0 g/dL 30.4     RDW 11.3 - 14.5 % 15.7     RDW-SD 37.0 - 54.0 fl 54.0    MPV 6.0 - 12.0 fL 10.2    Platelets 150 - 450 10*3/mm3 25       Assessment and Plan:         1. Shortness of breath    - ECG 12 Lead; Atrial fibrillation 58 bpm    - BNP; Future  - Comprehensive Metabolic Panel; Future  - CBC (No Diff); Future    2. Edema, unspecified type    - furosemide (LASIX) 20 MG tablet; Lasix 20 mg daily, alternating 20 mg  twice a day every other day.  Dispense: 60 tablet; Refill: 3    Compression stockings/ace wraps  Keep legs elevated    3. Scrotal swelling  Lasix  Discussed scrotal wraps with PT, pt defered at this time    4. Essential hypertension  Elevated today.  On norvasc    F/u 2 weeks.        *Please note that portions of this note were completed with a voice recognition program. Efforts were made to edit the dictations, but occasionally words are mistranscribed.

## 2018-04-25 NOTE — PROGRESS NOTES
[facility]  [facility]      Encounter Date:04/25/2018     Kareem Steiner  [address] [place] [address]  [phone]    5/3/1924    Eloy Aguilar MD    Kareem Steiner is a 89 y.o. male.      Subjective:     Chief Complaint:  Follow-up (edema)       HPI     Patient with a history of paroxysmal atrial fibrillation/atrial flutter.  Aspirin only due to high risk and frequent falls.  Recently complaining of worsening lower extremity edema, scrotal swelling.  Duration greater than one month.  History of venous insufficiency.  Patient is following a fluid restriction of 1500 mL of fluid or less.  Lasix increased.  He is taking Lasix 20 mg daily alternating 20 mg twice a day every other day.  Compression stockings were encouraged.  Patient following up today for evaluation.    Patient Active Problem List    Diagnosis   • Localized edema [R60.0]   • CKD (chronic kidney disease) [N18.9]   • AV heart block [I44.30]   • Gynecomastia [N62]   • Memory loss [R41.3]   • Altered mental state [R41.82]   • Small bowel obstruction [K56.609]   • Deafness [H91.90]     Overview Note:     H/O     • Prostate CA [C61]     Overview Note:     h/o     • Atrial flutter [I48.92]     Overview Note:     · Cardiac event monitor 6/30/17: Nonsustained, rate controlled, asymptomatic atrial flutter.  1 episode 2-1 AV block and one episode of transient high-grade AV block.  Asymptomatic pars 4.1 seconds     • Right-sided carotid artery disease [I77.9]     Overview Note:     S/p Rt CEA 2014     • Closed compression fracture of thoracic vertebra [S22.000A]   • Laceration of finger of right hand [S61.219A]   • Chronic midline low back pain without sciatica [M54.5, G89.29]   • Anemia [D64.9]   • Abdominal aortic aneurysm [I71.4]   • Benign prostatic hyperplasia [N40.0]   • Gastroesophageal reflux disease [K21.9]   • Unsteady gait [R26.81]   • Hyperlipidemia [E78.5]   • Hypertension [I10]   • Mild cognitive disorder  [F09]     Overview Note:     MMSE=27 (0/3 Recall -- it is unclear whether the patient heard the instructions correctly due to his hearing loss) 10/20/15     • Cerebrovascular accident [I63.9]   • Bradycardia [R00.1]     Overview Note:     Echocardiogram 6/30/17: EF 60%, mild MR, mild TR, RVSP 35.6 mmHg     • Constipation [K59.00]         Past Surgical History:   Procedure Laterality Date   • APPENDECTOMY     • BLADDER SURGERY     • CATARACT EXTRACTION     • COLON SURGERY     • PROSTATE SURGERY     • THROMBOENDARTERECTOMY         Allergies   Allergen Reactions   • Atorvastatin Myalgia         Current Outpatient Prescriptions:   •  amLODIPine (NORVASC) 5 MG tablet, Take 1 tablet by mouth Daily., Disp: 30 tablet, Rfl: 5  •  aspirin 325 MG tablet, Take 1 tablet by mouth daily., Disp: , Rfl:   •  Cetirizine HCl (ZYRTEC ALLERGY PO), Take 10 mg by mouth Daily., Disp: , Rfl:   •  coenzyme Q10 100 MG capsule, Take 100 mg by mouth Daily., Disp: , Rfl:   •  donepezil (ARICEPT) 10 MG tablet, Take 1 tablet by mouth Daily., Disp: 90 tablet, Rfl: 3  •  famotidine (PEPCID) 20 MG tablet, TAKE ONE TABLET BY MOUTH TWICE A DAY, Disp: 60 tablet, Rfl: 4  •  hydrALAZINE (APRESOLINE) 10 MG tablet, TAKE ONE TABLET BY MOUTH THREE TIMES A DAY, Disp: 90 tablet, Rfl: 3  •  Iron, Ferrous Gluconate, 256 (28 Fe) MG tablet, Take  by mouth Daily., Disp: , Rfl:   •  Multiple Vitamins-Minerals (CENTRUM SILVER PO), Take  by mouth Daily., Disp: , Rfl:   •  Multiple Vitamins-Minerals (EYE VITAMINS) capsule, Take 1 capsule by mouth daily., Disp: , Rfl:   •  oxybutynin XL (DITROPAN XL) 15 MG 24 hr tablet, Take 15 mg by mouth Daily., Disp: , Rfl:   •  potassium chloride (K-DUR) 10 MEQ CR tablet, Take 1 tablet by mouth As Needed (with prn lasix)., Disp: 30 tablet, Rfl: 11  •  Probiotic Product (PROBIOTIC ADVANCED PO), Take 1 capsule by mouth Daily., Disp: , Rfl:   •  tamsulosin (FLOMAX) 0.4 MG capsule 24 hr capsule, Take 1 capsule by mouth Daily., Disp: ,  Rfl:   •  torsemide (DEMADEX) 20 MG tablet, Take 1 tablet by mouth Daily., Disp: 30 tablet, Rfl: 3    The following portions of the patient's history were reviewed and updated as appropriate: allergies, current medications, past family history, past medical history, past social history, past surgical history and problem list.    Review of Systems   Constitution: Positive for weakness, malaise/fatigue and weight gain. Negative for chills, decreased appetite, diaphoresis, fever, night sweats and weight loss.   HENT: Positive for congestion and hearing loss. Negative for nosebleeds.    Eyes: Negative for blurred vision, visual disturbance and visual halos.   Cardiovascular: Positive for claudication, dyspnea on exertion, irregular heartbeat and leg swelling. Negative for chest pain, cyanosis, near-syncope, orthopnea, palpitations, paroxysmal nocturnal dyspnea and syncope.   Respiratory: Positive for sputum production. Negative for cough, hemoptysis, shortness of breath, sleep disturbances due to breathing, snoring and wheezing.    Endocrine: Positive for cold intolerance and polyuria. Negative for heat intolerance, polydipsia and polyphagia.   Hematologic/Lymphatic: Does not bruise/bleed easily.   Skin: Negative for dry skin, itching and rash.   Musculoskeletal: Positive for joint pain, muscle cramps and muscle weakness. Negative for falls, joint swelling and myalgias.   Gastrointestinal: Positive for abdominal pain, constipation and diarrhea. Negative for bloating, dysphagia, heartburn, melena, nausea and vomiting.   Genitourinary: Positive for dysuria and frequency. Negative for flank pain, hematuria and nocturia.   Neurological: Positive for excessive daytime sleepiness and loss of balance. Negative for difficulty with concentration, dizziness and headaches.   Psychiatric/Behavioral: Positive for altered mental status. Negative for depression. The patient has insomnia and is nervous/anxious.    Allergic/Immunologic:  "Positive for environmental allergies.       Objective:     Vitals:    04/25/18 1342 04/25/18 1348   BP: 153/64 124/58   BP Location: Right arm Left arm   Patient Position: Sitting Sitting   Pulse: 65 63   Resp: 18    Temp: 97.9 °F (36.6 °C)    TempSrc: Temporal Artery     SpO2: 97%    Weight: 83 kg (183 lb)    Height: 177.8 cm (70\")          Physical Exam  Physical Exam   Constitutional: He is oriented to person, place, and time. He appears well-developed and well-nourished. No distress.   HENT:   Head: Normocephalic and atraumatic.   Mouth/Throat: Oropharynx is clear and moist.   Eyes: Conjunctivae are normal. Pupils are equal, round, and reactive to light. No scleral icterus.   Neck: No hepatojugular reflux and no JVD present. Carotid bruit is not present. No tracheal deviation present. No thyromegaly present.   Cardiovascular: Normal rate, normal heart sounds and intact distal pulses.  An irregularly irregular rhythm present. Exam reveals no friction rub.    No murmur heard.  Pulmonary/Chest: He is in respiratory distress. He has rales.   Abdominal: Soft. Bowel sounds are normal. He exhibits no distension. There is no tenderness.   Musculoskeletal: He exhibits edema (2+ lower extremity edema.  Srotal swelling moderate.).   Lymphadenopathy:     He has no cervical adenopathy.   Neurological: He is alert and oriented to person, place, and time.   Skin: Skin is warm, dry and intact. No rash noted. No cyanosis or erythema. No pallor.   Psychiatric: He has a normal mood and affect. His behavior is normal. Thought content normal.   Vitals reviewed.  Lab and Diagnostic Review:  Lab on 04/11/2018   Component Date Value Ref Range Status   • BNP 04/11/2018 343.0* 0.0 - 100.0 pg/mL Final   • Glucose 04/11/2018 92  70 - 100 mg/dL Final   • BUN 04/11/2018 44* 9 - 23 mg/dL Final   • Creatinine 04/11/2018 2.20* 0.60 - 1.30 mg/dL Final   • Sodium 04/11/2018 143  132 - 146 mmol/L Final   • Potassium 04/11/2018 5.1  3.5 - 5.5 " mmol/L Final   • Chloride 04/11/2018 115* 99 - 109 mmol/L Final   • CO2 04/11/2018 21.0  20.0 - 31.0 mmol/L Final   • Calcium 04/11/2018 8.7  8.7 - 10.4 mg/dL Final   • Total Protein 04/11/2018 6.2  5.7 - 8.2 g/dL Final   • Albumin 04/11/2018 3.60  3.20 - 4.80 g/dL Final   • ALT (SGPT) 04/11/2018 19  7 - 40 U/L Final   • AST (SGOT) 04/11/2018 23  0 - 33 U/L Final   • Alkaline Phosphatase 04/11/2018 126* 25 - 100 U/L Final   • Total Bilirubin 04/11/2018 0.4  0.3 - 1.2 mg/dL Final   • eGFR Non African Amer 04/11/2018 28* >60 mL/min/1.73 Final   • Globulin 04/11/2018 2.6  gm/dL Final   • A/G Ratio 04/11/2018 1.4* 1.5 - 2.5 g/dL Final   • BUN/Creatinine Ratio 04/11/2018 20.0  7.0 - 25.0 Final   • Anion Gap 04/11/2018 7.0  3.0 - 11.0 mmol/L Final   • WBC 04/11/2018 7.24  3.50 - 10.80 10*3/mm3 Final   • RBC 04/11/2018 3.53* 4.20 - 5.76 10*6/mm3 Final   • Hemoglobin 04/11/2018 10.0* 13.1 - 17.5 g/dL Final   • Hematocrit 04/11/2018 32.9* 38.9 - 50.9 % Final   • MCV 04/11/2018 93.2  80.0 - 99.0 fL Final   • MCH 04/11/2018 28.3  27.0 - 31.0 pg Final   • MCHC 04/11/2018 30.4* 32.0 - 36.0 g/dL Final   • RDW 04/11/2018 15.7* 11.3 - 14.5 % Final   • RDW-SD 04/11/2018 54.0  37.0 - 54.0 fl Final   • MPV 04/11/2018 10.2  6.0 - 12.0 fL Final   • Platelets 04/11/2018 259  150 - 450 10*3/mm3 Final       Assessment and Plan:         1. Localized edema  No improvement  D/c lasix and change to demadex  - torsemide (DEMADEX) 20 MG tablet; Take 1 tablet by mouth Daily.  Dispense: 30 tablet; Refill: 3  - Basic Metabolic Panel; today  Compression stockings on today    2. Scrotal edema  Education provided on scrotal wraps    3. Bradycardia  SB 49 bpm  No HR lowering meds    4. Stage 3 chronic kidney disease    - Basic Metabolic Panel; Future    5. Atrial flutter, unspecified type  SB  ASA only due to fall risk and bleeding risk    F/u 2 weeks or sooner if needed.        *Please note that portions of this note were completed with a voice  recognition program. Efforts were made to edit the dictations, but occasionally words are mistranscribed.

## 2018-04-26 NOTE — TELEPHONE ENCOUNTER
----- Message from Miriam Robins MA sent at 4/26/2018  4:23 PM EDT -----  Spoke with pt's daughter, instructions given, verbalized understanding.   ----- Message -----  From: AGUILA Fu  Sent: 4/26/2018   8:12 AM  To: Miriam Robins MA    Labs are stable.  Continue meds as discussed in clinic yesterday.  Torsemide 20 mg daily.  KCL 10 meq.  We will need to repeat labs 1 week.  Lab order in [facility].

## 2018-04-26 NOTE — PROGRESS NOTES
Lab Results   Component Value Date    GLUCOSE 91 04/25/2018    CALCIUM 9.1 04/25/2018     04/25/2018    K 5.1 04/25/2018    CO2 25.0 04/25/2018     (H) 04/25/2018    BUN 45 (H) 04/25/2018    CREATININE 2.30 (H) 04/25/2018    EGFRIFNONA 27 (L) 04/25/2018    BCR 19.6 04/25/2018    ANIONGAP 8.0 04/25/2018     Torsemide 20 mg daily,  KCL 10 meq daily    Repeat BMP 1 week    Discussion of CKD and use of diuretics worsening kidney function.

## 2018-05-02 NOTE — PROGRESS NOTES
Chief Complaint   Patient presents with   • Rectal Bleeding     started last night       Subjective       History of Present Illness     Kareem Steiner is a 89 y.o. male. He presents with one day history of bright red blood per rectum following bowel movement, as well as 4 week history of diarrhea. Patient and his daughter provide the history. Patient states that he took Miralax for the first time yesterday afternoon and he subsequently had four bowel movements with diarrhea throughout evening. He describes it as very loose to watery stool. He says he normally has a BM approximately every 3 days, but it has been diarrhea for about a month. He has urgency when he has a bowel movement. Last night following 4th BM around 10pm, patient states there was a large amount of blood following the bowel movement, blood in the toilet as well as on toilet paper. He states it continued to bleed off and on for more than an hour, but most significantly following bowel movement. It resolved spontaneously and he has had no more bowel movements today and no more bleeding. He has never had hemorrhoid or blood per rectum before this episode. He denies pain with bowel movements. He denies abdominal pain. No itching or discomfort in perianal region. No difficulty with urination from baseline frequency, which he states is chronic. Also has lower extremity peripheral edema which is not a new problem and is improving with medication.  Patient denies chest pain, SOA, fever, chills, N/V or any additional associated symptoms.     He is taking a probiotic daily. He is not taking a fiber supplement. He states his bowel movements were normal prior to one month ago when diarrheal episodes began. His wife has also had similar diarrheal episodes x3 weeks.         The following portions of the patient's history were reviewed and updated as appropriate: allergies, current medications, past social history and problem list.    Allergies   Allergen  Reactions   • Atorvastatin Myalgia     Social History   Substance Use Topics   • Smoking status: Former Smoker   • Smokeless tobacco: Never Used      Comment: smoked a pipe daily for 50 yrs, quit 30 yrs ago   • Alcohol use No         Current Outpatient Prescriptions:   •  amLODIPine (NORVASC) 5 MG tablet, Take 1 tablet by mouth Daily., Disp: 30 tablet, Rfl: 5  •  aspirin 325 MG tablet, Take 1 tablet by mouth daily., Disp: , Rfl:   •  Cetirizine HCl (ZYRTEC ALLERGY PO), Take 10 mg by mouth Daily., Disp: , Rfl:   •  coenzyme Q10 100 MG capsule, Take 100 mg by mouth Daily., Disp: , Rfl:   •  donepezil (ARICEPT) 10 MG tablet, Take 1 tablet by mouth Daily., Disp: 90 tablet, Rfl: 3  •  famotidine (PEPCID) 20 MG tablet, TAKE ONE TABLET BY MOUTH TWICE A DAY, Disp: 60 tablet, Rfl: 4  •  hydrALAZINE (APRESOLINE) 10 MG tablet, TAKE ONE TABLET BY MOUTH THREE TIMES A DAY, Disp: 90 tablet, Rfl: 3  •  Iron, Ferrous Gluconate, 256 (28 Fe) MG tablet, Take  by mouth Daily., Disp: , Rfl:   •  Multiple Vitamins-Minerals (CENTRUM SILVER PO), Take  by mouth Daily., Disp: , Rfl:   •  Multiple Vitamins-Minerals (EYE VITAMINS) capsule, Take 1 capsule by mouth daily., Disp: , Rfl:   •  oxybutynin XL (DITROPAN XL) 15 MG 24 hr tablet, Take 15 mg by mouth Daily., Disp: , Rfl:   •  potassium chloride (K-DUR) 10 MEQ CR tablet, Take 1 tablet by mouth As Needed (with prn lasix)., Disp: 30 tablet, Rfl: 11  •  Probiotic Product (PROBIOTIC ADVANCED PO), Take 1 capsule by mouth Daily., Disp: , Rfl:   •  tamsulosin (FLOMAX) 0.4 MG capsule 24 hr capsule, Take 1 capsule by mouth Daily., Disp: , Rfl:   •  torsemide (DEMADEX) 20 MG tablet, Take 1 tablet by mouth Daily., Disp: 30 tablet, Rfl: 3    Review of Systems   Constitutional: Negative for appetite change, chills and fever.   HENT: Negative for sore throat and trouble swallowing.    Respiratory: Negative for cough, chest tightness and shortness of breath.    Cardiovascular: Positive for leg swelling.  Negative for chest pain.   Gastrointestinal: Positive for anal bleeding and diarrhea. Negative for abdominal pain, nausea, rectal pain and vomiting.   Genitourinary: Positive for frequency. Negative for difficulty urinating, dysuria and hematuria.   Skin: Negative for rash.   Neurological: Negative for dizziness and headache.   Hematological: Bruises/bleeds easily.       Objective   Vitals:    05/02/18 1330   BP: 122/68   Pulse: 66   Resp: 16   Temp: 97.4 °F (36.3 °C)     Physical Exam   Constitutional: He appears well-developed and well-nourished.   HENT:   Mouth/Throat: Oropharynx is clear and moist.   Neck: Normal range of motion. Neck supple. Carotid bruit is not present. No thyromegaly present.   Cardiovascular: Normal rate, regular rhythm, normal heart sounds and intact distal pulses.    No murmur heard.  Bilateral 1+ peripheral edema of lower extremities.    Pulmonary/Chest: Effort normal and breath sounds normal.   Abdominal: Soft. There is no tenderness.   Genitourinary: Rectal exam shows external hemorrhoid.   Genitourinary Comments: One external hemorrhoid noted at 5 o'clock location with patient prone position. No active bleeding, no tenderness.    Lymphadenopathy:     He has no cervical adenopathy.   Psychiatric: He has a normal mood and affect.   Nursing note and vitals reviewed.        Assessment/Plan   Kareem was seen today for rectal bleeding.    Diagnoses and all orders for this visit:    Diarrhea, unspecified type  -     Stool Culture - Stool, Per Rectum; Future  -     Ova & Parasite Examination - Stool, Per Rectum; Future  -     Clostridium Difficile Toxin, PCR - Stool, Per Rectum; Future  -     Fecal Leukocytes - Stool, Per Rectum; Future    Blood in stool  -     Stool Culture - Stool, Per Rectum; Future  -     Ova & Parasite Examination - Stool, Per Rectum; Future  -     Clostridium Difficile Toxin, PCR - Stool, Per Rectum; Future  -     Fecal Leukocytes - Stool, Per Rectum;  Future    Hemorrhoids, unspecified hemorrhoid type    Patient has visible external hemorrhoid on exam, may also have internal hemorrhoids which are not visible and contributing to his rectal bleeding. No itching or pain with bowel movements, but informed patient and daughter of OTC treatments including Prep H cream and hydrocortisone suppositories if he becomes symptomatic or wants to reduce irritation.   Patient advised to monitor for continued symptoms and will evaluate further as necessary.   OK to discontinue Miralax at this time as patient is hesitant to take it following this episode after first use of Miralax. We will consider other tx options after stool studies are reviewed.        Return in about 4 weeks (around 5/30/2018) for Recheck- with Dr. Aguilar or [place].

## 2018-05-02 NOTE — TELEPHONE ENCOUNTER
----- Message from Arabella Ureña sent at 5/2/2018  8:57 AM EDT -----  PATIENT'S WIFE, MORGAN, STATED THAT PATIENT HAD A LOT OF BLEEDING FROM THE RECTUM LAST NIGHT. STATED SHE HASN'T SEEN ANY THIS MORNING, BUT THERE WAS A LOT LAST NIGHT AND IS NEEDING TO KNOW WHAT TO Do.     PLEASE CALL MORGAN AT : [phone]    THANK YOU

## 2018-05-07 NOTE — PROGRESS NOTES
Results for orders placed or performed in visit on 05/04/18   Basic Metabolic Panel   Result Value Ref Range    Glucose 93 70 - 100 mg/dL    BUN 56 (H) 9 - 23 mg/dL    Creatinine 2.80 (H) 0.60 - 1.30 mg/dL    Sodium 144 132 - 146 mmol/L    Potassium 4.4 3.5 - 5.5 mmol/L    Chloride 112 (H) 99 - 109 mmol/L    CO2 21.0 20.0 - 31.0 mmol/L    Calcium 8.3 (L) 8.7 - 10.4 mg/dL    eGFR Non African Amer 21 (L) >60 mL/min/1.73    BUN/Creatinine Ratio 20.0 7.0 - 25.0    Anion Gap 11.0 3.0 - 11.0 mmol/L     Pt to hold Torsemide 2 days, then decrease Torsemide 10 mg daily    HOld KCL 2 days then resume KCL 10 meq daily

## 2018-05-07 NOTE — TELEPHONE ENCOUNTER
----- Message from Mriiam Robins MA sent at 5/7/2018 10:32 AM EDT -----  Spoke with pt's daughter, Omaira.  She fills pt's pill box and will adjust medications accordingly.    Appointment confirmed with you for 5/14/18 at 3 pm.   Omaira verbalized understanding, no futher questions    ----- Message -----  From: AGUILA Fu  Sent: 5/7/2018   8:16 AM  To: Miriam Robins MA    Pts kidney function has worsened.  Hold Torsemide for 2 days, then decrease Torsemide 10 mg daily.  Hold KCL for 2 days, then continue same dose.

## 2018-05-14 NOTE — PROGRESS NOTES
[facility]      Encounter Date:05/14/2018     Kareem Steiner  [address] [place] [address]  [phone]    5/3/1924    Eloy Aguilar MD    Kareem Steiner is a 89 y.o. male.      Subjective:     Chief Complaint:  Follow-up       HPI     Patient with a history of paroxysmal atrial fibrillation/atrial flutter.  Aspirin only due to high risk and frequent falls.  Recent lower extremity edema and scrotal swelling.  Duration greater than one month.  History of venous insufficiency.  Patient is following a fluid restriction of 1500 mL of fluid per day.  Recently changed from Lasix to torsemide. Pt with CKD (worsening creatine with uptitration of diurectics).  Patient using scrotal wrap at home.  History of sinus bradycardia without any heart rate lowering medications.  Patient presents today for follow-up visit.    Edema has improved.  Dyspnea has improved.  Scrotal swelling improved.  Pt has noticed 3 lb weigh loss.  Denies CP, pressure, palpitations, orthopnea/PND    Patient Active Problem List    Diagnosis   • Localized edema [R60.0]   • CKD (chronic kidney disease) [N18.9]   • AV heart block [I44.30]   • Gynecomastia [N62]   • Memory loss [R41.3]   • Altered mental state [R41.82]   • Small bowel obstruction [K56.609]   • Deafness [H91.90]     Overview Note:     H/O     • Prostate CA [C61]     Overview Note:     h/o     • Atrial flutter [I48.92]     Overview Note:     · Cardiac event monitor 6/30/17: Nonsustained, rate controlled, asymptomatic atrial flutter.  1 episode 2-1 AV block and one episode of transient high-grade AV block.  Asymptomatic pars 4.1 seconds     • Right-sided carotid artery disease [I77.9]     Overview Note:     S/p Rt CEA 2014     • Closed compression fracture of thoracic vertebra [S22.000A]   • Laceration of finger of right hand [S61.219A]   • Chronic midline low back pain without sciatica [M54.5, G89.29]   • Anemia [D64.9]   • Abdominal aortic  aneurysm [I71.4]   • Benign prostatic hyperplasia [N40.0]   • Gastroesophageal reflux disease [K21.9]   • Unsteady gait [R26.81]   • Hyperlipidemia [E78.5]   • Hypertension [I10]   • Mild cognitive disorder [F09]     Overview Note:     MMSE=27 (0/3 Recall -- it is unclear whether the patient heard the instructions correctly due to his hearing loss) 10/20/15     • Cerebrovascular accident [I63.9]   • Bradycardia [R00.1]     Overview Note:     Echocardiogram 6/30/17: EF 60%, mild MR, mild TR, RVSP 35.6 mmHg     • Constipation [K59.00]         Past Surgical History:   Procedure Laterality Date   • APPENDECTOMY     • BLADDER SURGERY     • CATARACT EXTRACTION     • COLON SURGERY     • PROSTATE SURGERY     • THROMBOENDARTERECTOMY         Allergies   Allergen Reactions   • Atorvastatin Myalgia         Current Outpatient Prescriptions:   •  amLODIPine (NORVASC) 5 MG tablet, Take 1 tablet by mouth Daily., Disp: 30 tablet, Rfl: 5  •  aspirin 325 MG tablet, Take 1 tablet by mouth daily., Disp: , Rfl:   •  Cetirizine HCl (ZYRTEC ALLERGY PO), Take 10 mg by mouth Daily., Disp: , Rfl:   •  coenzyme Q10 100 MG capsule, Take 100 mg by mouth Daily., Disp: , Rfl:   •  donepezil (ARICEPT) 10 MG tablet, Take 1 tablet by mouth Daily., Disp: 90 tablet, Rfl: 3  •  famotidine (PEPCID) 20 MG tablet, TAKE ONE TABLET BY MOUTH TWICE A DAY, Disp: 60 tablet, Rfl: 4  •  hydrALAZINE (APRESOLINE) 10 MG tablet, TAKE ONE TABLET BY MOUTH THREE TIMES A DAY, Disp: 90 tablet, Rfl: 3  •  Iron, Ferrous Gluconate, 256 (28 Fe) MG tablet, Take  by mouth Daily., Disp: , Rfl:   •  Multiple Vitamins-Minerals (CENTRUM SILVER PO), Take  by mouth Daily., Disp: , Rfl:   •  Multiple Vitamins-Minerals (EYE VITAMINS) capsule, Take 1 capsule by mouth daily., Disp: , Rfl:   •  oxybutynin XL (DITROPAN XL) 15 MG 24 hr tablet, Take 15 mg by mouth Daily., Disp: , Rfl:   •  potassium chloride (K-DUR) 10 MEQ CR tablet, Take 1 tablet by mouth As Needed (with prn lasix)., Disp:  "30 tablet, Rfl: 11  •  Probiotic Product (PROBIOTIC ADVANCED PO), Take 1 capsule by mouth Daily., Disp: , Rfl:   •  tamsulosin (FLOMAX) 0.4 MG capsule 24 hr capsule, Take 1 capsule by mouth Daily., Disp: , Rfl:   •  torsemide (DEMADEX) 20 MG tablet, Take 0.5 tablets by mouth Daily., Disp: 15 tablet, Rfl: 3    The following portions of the patient's history were reviewed and updated as appropriate: allergies, current medications, past family history, past medical history, past social history, past surgical history and problem list.    Review of Systems   Constitution: Positive for malaise/fatigue.   Cardiovascular: Positive for leg swelling.   Respiratory: Positive for shortness of breath.    Genitourinary: Positive for frequency.       Objective:     Vitals:    05/14/18 1511 05/14/18 1515   BP: 168/73 147/78   BP Location: Right arm Left arm   Patient Position: Sitting Sitting   Pulse: 73 62   Resp: 16    Temp: 99.2 °F (37.3 °C)    TempSrc: Temporal Artery     SpO2: 95%    Weight: 79.9 kg (176 lb 3.2 oz)    Height: 177.8 cm (70\")          Physical Exam   Constitutional: He is oriented to person, place, and time. He appears well-developed and well-nourished. No distress.   HENT:   Head: Normocephalic and atraumatic.   Mouth/Throat: Oropharynx is clear and moist.   Eyes: Conjunctivae are normal. Pupils are equal, round, and reactive to light. No scleral icterus.   Neck: No hepatojugular reflux and no JVD present. Carotid bruit is not present. No tracheal deviation present. No thyromegaly present.   Cardiovascular: Normal rate, normal heart sounds and intact distal pulses.  An irregular rhythm present. Exam reveals no friction rub.    No murmur heard.  Pulmonary/Chest: Effort normal. No respiratory distress. He has no wheezes. He has no rales.   Abdominal: Soft. Bowel sounds are normal. He exhibits no distension. There is no tenderness.   Musculoskeletal: He exhibits edema (1+ pitting edema, wearing compresssion.  mild " srotal edema).   Lymphadenopathy:     He has no cervical adenopathy.   Neurological: He is alert and oriented to person, place, and time.   Skin: Skin is warm, dry and intact. No rash noted. No cyanosis or erythema. No pallor.   Psychiatric: He has a normal mood and affect. His behavior is normal. Thought content normal.   Vitals reviewed.      Lab and Diagnostic Review:  Lab Results   Component Value Date    GLUCOSE 93 05/04/2018    BUN 56 (H) 05/04/2018    CREATININE 2.80 (H) 05/04/2018    EGFRIFNONA 21 (L) 05/04/2018    BCR 20.0 05/04/2018    K 4.4 05/04/2018    CO2 21.0 05/04/2018    CALCIUM 8.3 (L) 05/04/2018    PROTENTOTREF 6.0 03/04/2015    ALBUMIN 3.60 04/11/2018    LABIL2 1.4 (L) 04/11/2018    AST 23 04/11/2018    ALT 19 04/11/2018       Assessment and Plan:         1. Localized edema  Continue Torsemide    - Basic Metabolic Panel    2. Atrial flutter, unspecified type  Rate controlled  Asa due to fall risk    3. Bradycardia  stable    4. Essential hypertension  Elevated initially, then improved.    Will not uptitrate due to fall risk and intermittent dizziness and unsteady gait    5. Stage 3 chronic kidney disease    - Basic Metabolic Panel    F/u as scheduled.    *Please note that portions of this note were completed with a voice recognition program. Efforts were made to edit the dictations, but occasionally words are mistranscribed.

## 2018-05-16 NOTE — PROGRESS NOTES
Subjective   Kareem Steiner is a 89 y.o. male.     Chief Complaint   Patient presents with   • Memory Loss       History of Present Illness   Pt originally seen 4/15 and followed for mild cognitive impairment. Workup including brain MRI and laboratory testing including B12, folate and TSH OK. He was started on donepezil. October 2015 MMSE was 27/30, but ?hearing loss affecting score on memory.      He had repeat head CT on 5/1/16 while hospitalized for cardiac arrhythmia, constipation and urinary retention 2/2 prostate. Wife notes he was feeling unwell, sleeping on couch, snoring loudly, and she couldn't wake him. His benadryl was discontinued, and other medications were adjusted. No delirium while hospitalized.    11/16: he and his daughter note no decline in cognitive abilities including ability to perform ADLs, worsening of memory, orientation, judgment or language. He continues to read, particularly history. MMSE 30/30. Noted likely benefit from donepezil.   5/17: pt feels doing well cognitively, dtr notes good days and bad, but stable cognition overall. Balance is worse, several near falls. Can't say if feet numb. Limited walking by knee pain as well. Sleeps poorly, falls asleep sitting in day. MMSE was 27/30, 0/3 recall.   11/17: dtr reports he has trouble with memory, repeats a lot, gets disoriented, cannot plan, or take meds correctly, but no hallucinations or delusions. If eg an appt changes, he cannot adjust to it, gets agitated and cannot take in the new information. Performs own ADLs, although wife sets out clothes for him. He feels he is doing well, same problems with getting up to bathroom in night, and daytime sleepiness, which he denies. Dtr unsure how to deal with his inability to learn new things (eg new remote, unable to use after 3 weeks) and his inability to reorient. MMSE was 29/30.  Today: hearing severely impaired, unable to do MMSE reliably. Says he's doing OK, can't remember names as well.  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 Not doing as well otherwise per family. Went to cardiology appt Monday (where noted recent EDISON, and family also notes slow heart rate), and was unable to walk alone, \"forgot\" how to move his left leg. Arms unimpaired, no alt consc or speech difficulty. Took them an hour to get in from car to house. Later that night got up on own to bathroom couple times. At baseline, has marked edema LLE, cannot move it well.  Has paroxysmal afib/flutter, on aspirin only 2/2 fall risk. No problems using arms, except sore right shoulder.       Allergies   Allergen Reactions   • Atorvastatin Myalgia       Current Outpatient Prescriptions on File Prior to Visit   Medication Sig Dispense Refill   • amLODIPine (NORVASC) 5 MG tablet Take 1 tablet by mouth Daily. 30 tablet 5   • aspirin 325 MG tablet Take 1 tablet by mouth daily.     • Cetirizine HCl (ZYRTEC ALLERGY PO) Take 10 mg by mouth Daily.     • coenzyme Q10 100 MG capsule Take 100 mg by mouth Daily.     • donepezil (ARICEPT) 10 MG tablet Take 1 tablet by mouth Daily. 90 tablet 3   • famotidine (PEPCID) 20 MG tablet TAKE ONE TABLET BY MOUTH TWICE A DAY 60 tablet 4   • hydrALAZINE (APRESOLINE) 10 MG tablet TAKE ONE TABLET BY MOUTH THREE TIMES A DAY 90 tablet 3   • Iron, Ferrous Gluconate, 256 (28 Fe) MG tablet Take  by mouth Daily.     • Multiple Vitamins-Minerals (CENTRUM SILVER PO) Take  by mouth Daily.     • Multiple Vitamins-Minerals (EYE VITAMINS) capsule Take 1 capsule by mouth daily.     • oxybutynin XL (DITROPAN XL) 15 MG 24 hr tablet Take 15 mg by mouth Daily.     • Probiotic Product (PROBIOTIC ADVANCED PO) Take 1 capsule by mouth Daily.     • tamsulosin (FLOMAX) 0.4 MG capsule 24 hr capsule Take 1 capsule by mouth Daily.     • torsemide (DEMADEX) 20 MG tablet Take 0.5 tablets by mouth Daily. 15 tablet 3     No current facility-administered medications on file prior to visit.        Past " "Medical History:   Diagnosis Date   • Abrasion of face    • Acute sinusitis    • Clostridium difficile colitis    • Deafness    • Diarrhea    • Drowsiness    • Edema    • Gait instability    • Hyperlipidemia    • Hypertension    • Joint pain    • Knee pain    • Prostate cancer    • TIA (transient ischemic attack)    • Urinary frequency        Past Surgical History:   Procedure Laterality Date   • APPENDECTOMY     • BLADDER SURGERY     • CATARACT EXTRACTION     • COLON SURGERY     • PROSTATE SURGERY     • THROMBOENDARTERECTOMY         Social History     Social History   • Marital status:      Spouse name: N/A   • Number of children: N/A   • Years of education: N/A     Occupational History   • retired      Social History Main Topics   • Smoking status: Former Smoker     Types: Pipe   • Smokeless tobacco: Never Used      Comment: smoked a pipe daily for 50 yrs, quit 30 yrs ago   • Alcohol use No   • Drug use: No   • Sexual activity: Defer     Other Topics Concern   • Not on file     Social History Narrative    Patient consumes 3 serving of caffeine daily.     Patient lives at home with wife.            Review of Systems   Constitutional: Negative for fever and unexpected weight change.   Respiratory: Negative for cough and shortness of breath.    Cardiovascular: Positive for leg swelling. Negative for chest pain.   Neurological: Positive for weakness. Negative for facial asymmetry, speech difficulty and headaches.       Objective   Blood pressure 110/68, height 177.8 cm (70\"), weight 79.8 kg (176 lb).    Physical Exam   Constitutional: He appears well-developed and well-nourished.   HENT:   Head: Normocephalic and atraumatic.   Eyes: EOM are normal. Pupils are equal, round, and reactive to light.   Pulmonary/Chest: Effort normal.   Musculoskeletal: He exhibits edema.   Very marked edema LLE   Neurological: He has an abnormal Heel to Shin Test (OK right, barely able to lift his massively edematous left leg, and " cannot begin to perform HKS). He has a normal Finger-Nose-Finger Test. Gait normal.   Skin: Skin is warm and dry.   Psychiatric: He has a normal mood and affect. His speech is normal and behavior is normal.   Nursing note and vitals reviewed.      Neurologic Exam     Mental Status   Oriented to person.   Oriented to place.   Oriented to year, month and season.   Speech: speech is normal   Level of consciousness: alert  Knowledge: consistent with education.   Able to name object. Normal comprehension.     Cranial Nerves     CN II   Visual fields full to confrontation.     CN III, IV, VI   Pupils are equal, round, and reactive to light.  Extraocular motions are normal.     CN VII   Facial expression full, symmetric.     CN IX, X   CN IX normal.   CN X normal.   Palate: symmetric    CN XI   CN XI normal.     CN XII   CN XII normal.     Motor Exam   Muscle bulk: normal  Right arm pronator drift: absent  Left arm pronator drift: absent    Strength   Strength 5/5 except as noted.     Sensory Exam   Light touch normal.     Gait, Coordination, and Reflexes     Gait  Gait: normal    Coordination   Finger to nose coordination: normal  Heel to shin coordination: abnormal (OK right, barely able to lift his massively edematous left leg, and cannot begin to perform HKS)    Tremor   Resting tremor: absent  Intention tremor: absent  Action tremor: absent      Assessment/Plan     Kareem was seen today for memory loss.    Diagnoses and all orders for this visit:    Mild cognitive disorder    Gait dyspraxia      Discussion/Summary:  1. Stable cognition, will continue donepezil as is.  2. Transient gait difficulty -- discussed possibly TIA, but I think less likely, and on appropriate preventive medication. More likely systemic condition that caused transient decompensation in his already impaired ability to manipulate his left leg. Discussed at length, answered questions from wife and dtr.   25 min face to face, 15 min in discussion  as above.   Return in about 6 months (around 11/16/2018).

## 2018-06-06 NOTE — PROGRESS NOTES
Chief Complaint   Patient presents with   • Follow-up     1 MONTH FOLLOW UP        History of Present Illness    The patient presents for follow-up of diarrhea. He states that his symptoms have resolved. The symptoms are not associated with fever. The patient does not have other symptoms including a dry cough, a wet cough, wheezing, facial pain, a headache, eye drainage, ear pain, ear drainage, myalgias, sore throat, nasal discharge, decreased appetite, chills, lightheadedness, dizziness or dry mouth. The patient has not used anything for treatment of this illness.     Review of Systems    GENERAL/CONSTITUTIONAL- Denies Unexplained Weight Loss, Sweats, Fatigue, Weakness or Malaise.    GASTROINTESTINAL- Reports: Diarrhea. Denies: Abdominal Pain, Nausea, Vomiting, Blood per Rectum or Constipation.    Medications      Current Outpatient Prescriptions:   •  amLODIPine (NORVASC) 5 MG tablet, Take 1 tablet by mouth Daily., Disp: 30 tablet, Rfl: 5  •  aspirin 325 MG tablet, Take 1 tablet by mouth daily., Disp: , Rfl:   •  Cetirizine HCl (ZYRTEC ALLERGY PO), Take 10 mg by mouth Daily., Disp: , Rfl:   •  coenzyme Q10 100 MG capsule, Take 100 mg by mouth Daily., Disp: , Rfl:   •  donepezil (ARICEPT) 10 MG tablet, TAKE ONE TABLET BY MOUTH DAILY, Disp: 30 tablet, Rfl: 2  •  famotidine (PEPCID) 20 MG tablet, TAKE ONE TABLET BY MOUTH TWICE A DAY, Disp: 60 tablet, Rfl: 4  •  hydrALAZINE (APRESOLINE) 10 MG tablet, TAKE ONE TABLET BY MOUTH THREE TIMES A DAY, Disp: 90 tablet, Rfl: 3  •  Iron, Ferrous Gluconate, 256 (28 Fe) MG tablet, Take  by mouth Daily., Disp: , Rfl:   •  Multiple Vitamins-Minerals (CENTRUM SILVER PO), Take  by mouth Daily., Disp: , Rfl:   •  Multiple Vitamins-Minerals (EYE VITAMINS) capsule, Take 1 capsule by mouth daily., Disp: , Rfl:   •  oxybutynin XL (DITROPAN XL) 15 MG 24 hr tablet, Take 15 mg by mouth Daily., Disp: , Rfl:   •  Probiotic Product (PROBIOTIC ADVANCED PO), Take 1 capsule by mouth Daily., Disp: ,  Rfl:   •  tamsulosin (FLOMAX) 0.4 MG capsule 24 hr capsule, Take 1 capsule by mouth Daily., Disp: , Rfl:   •  torsemide (DEMADEX) 20 MG tablet, Take 0.5 tablets by mouth Daily., Disp: 15 tablet, Rfl: 3     Allergies    Allergies   Allergen Reactions   • Atorvastatin Myalgia       Problem List    Patient Active Problem List   Diagnosis   • Anemia   • Abdominal aortic aneurysm   • Benign prostatic hyperplasia   • Gastroesophageal reflux disease   • Unsteady gait   • Hyperlipidemia   • Hypertension   • Mild cognitive disorder   • Cerebrovascular accident   • Bradycardia   • Constipation   • Laceration of finger of right hand   • Chronic midline low back pain without sciatica   • Closed compression fracture of thoracic vertebra   • Right-sided carotid artery disease   • Gynecomastia   • Memory loss   • Altered mental state   • Small bowel obstruction   • Deafness   • Prostate CA   • Atrial flutter   • AV heart block   • Localized edema   • CKD (chronic kidney disease)       Medications, Allergies, Problems List and Past History were reviewed and updated.    Physical Examination    /60 (BP Location: Left arm, Patient Position: Sitting, Cuff Size: Adult)   Pulse 56   Temp 97.9 °F (36.6 °C) (Temporal Artery )   Resp 18   Wt 77.1 kg (170 lb)   BMI 24.39 kg/m²     Neck: Thyroid- non enlarged, symmetric and has no nodules. No bruits are detected.    Lungs: Auscultation- Clear to auscultation bilaterally. There are no retractions, clubbing or cyanosis. The Expiratory to Inspiratory ratio is equal.    Cardiovascular: Heart- Normal Rate with Regular rhythm and no murmurs.    Abdomen: Soft, benign, non-tender with no masses, hernias, organomegaly or scars.    Impression and Assessment    Diarrhea.    Plan    Diarrhea Plan: Resolved. Continue current medications.    Kareem was seen today for follow-up.    Diagnoses and all orders for this visit:    Diarrhea, unspecified type        Return to Office    The patient was  instructed to return for follow-up at the next scheduled visit.    The patient was instructed to return sooner if the condition changes, worsens, or doesn't resolve.

## 2018-06-10 NOTE — TELEPHONE ENCOUNTER
----- Message from Celena Reid sent at 6/7/2018  2:44 PM EDT -----  MORGAN [phone]    THEY NEED A NEW WALKER WITH A SET. CAN YOU ORDER?  AND CAN YOU RECOMMENDA SUPPLIER FOR THAT?

## 2018-06-11 NOTE — TELEPHONE ENCOUNTER
S/W PT WIFE, MORGAN, INFORMED THAT RX FOR WALKER IS READY.  STATES SHE WOULD LIKE TO PICK IT UP.  EXPL IT WILL BE UP FRONT IN  FILE.  INFORMED SHE CAN CALL AROUND AND CHECK PRICING FOR THE WALKER BUT THAT [facility] USUALLY ALWAYS HAS IN STOCK. VERB GOOD UNDERSTANDING AND GREAT APPREC.     RX PLACED UP FRONT IN  FILE.

## 2018-06-27 NOTE — PROGRESS NOTES
"[facility]      Encounter Date:06/27/2018     Kareem Steiner  [id number]  [place] [address]  [phone]    5/3/1924    Eloy Aguilar MD    Kareem Steiner is a 89 y.o. male.      Subjective:     Chief Complaint:  Follow-up (Localized edema)       HPI     Patient with a history of paroxysmal atrial fibrillation/atrial flutter.  Aspirin due to high risk and frequent falls.  Lower extremity edema and scrotal swelling.  Duration greater than one month.  History of venous insufficiency.  Patient is following a fluid restriction of 1500 mL per day.  Recently change Lasix to torsemide with improved edema and scrotal swelling as well as improved dyspnea.  Patient had a 3 pound weight loss on last office visit.  Patient has a history of chronic kidney disease.  History of sinus bradycardia without any heart rate lowering medications.        Pt denies CP, pressure, palpitations.  Edema has improved.  Scrotal edema resolved.  Has lost 10 lbs.  Poor appetite, not drinking well per daughter reports. Sleeping well. NO c/o of worsening fatigue.  \"I think I am doing good for an old man\".     Patient Active Problem List    Diagnosis   • Localized edema [R60.0]   • CKD (chronic kidney disease) [N18.9]   • AV heart block [I44.30]   • Gynecomastia [N62]   • Memory loss [R41.3]   • Altered mental state [R41.82]   • Small bowel obstruction [K56.609]   • Deafness [H91.90]     Overview Note:     H/O     • Prostate CA [C61]     Overview Note:     h/o     • Atrial flutter [I48.92]     Overview Note:     · Cardiac event monitor 6/30/17: Nonsustained, rate controlled, asymptomatic atrial flutter.  1 episode 2-1 AV block and one episode of transient high-grade AV block.  Asymptomatic pars 4.1 seconds     • Right-sided carotid artery disease [I77.9]     Overview Note:     S/p Rt CEA 2014     • Closed compression fracture of thoracic vertebra [S22.000A]   • Laceration of finger of right hand " [S64.219A]   • Chronic midline low back pain without sciatica [M54.5, G89.29]   • Anemia [D64.9]   • Abdominal aortic aneurysm [I71.4]   • Benign prostatic hyperplasia [N40.0]   • Gastroesophageal reflux disease [K21.9]   • Unsteady gait [R26.81]   • Hyperlipidemia [E78.5]   • Hypertension [I10]   • Mild cognitive disorder [F09]     Overview Note:     MMSE=27 (0/3 Recall -- it is unclear whether the patient heard the instructions correctly due to his hearing loss) 10/20/15     • Cerebrovascular accident [I63.9]   • Bradycardia [R00.1]     Overview Note:     Echocardiogram 6/30/17: EF 60%, mild MR, mild TR, RVSP 35.6 mmHg     • Constipation [K59.00]         Past Surgical History:   Procedure Laterality Date   • APPENDECTOMY     • BLADDER SURGERY     • CATARACT EXTRACTION     • COLON SURGERY     • PROSTATE SURGERY     • THROMBOENDARTERECTOMY         Allergies   Allergen Reactions   • Atorvastatin Myalgia         Current Outpatient Prescriptions:   •  amLODIPine (NORVASC) 5 MG tablet, Take 1 tablet by mouth Daily., Disp: 30 tablet, Rfl: 5  •  aspirin 325 MG tablet, Take 1 tablet by mouth daily., Disp: , Rfl:   •  Cetirizine HCl (ZYRTEC ALLERGY PO), Take 10 mg by mouth Daily., Disp: , Rfl:   •  coenzyme Q10 100 MG capsule, Take 100 mg by mouth Daily., Disp: , Rfl:   •  donepezil (ARICEPT) 10 MG tablet, TAKE ONE TABLET BY MOUTH DAILY, Disp: 30 tablet, Rfl: 2  •  famotidine (PEPCID) 20 MG tablet, TAKE ONE TABLET BY MOUTH TWICE A DAY, Disp: 60 tablet, Rfl: 4  •  hydrALAZINE (APRESOLINE) 10 MG tablet, TAKE ONE TABLET BY MOUTH THREE TIMES A DAY, Disp: 90 tablet, Rfl: 2  •  Multiple Vitamins-Minerals (CENTRUM SILVER PO), Take  by mouth Daily., Disp: , Rfl:   •  Multiple Vitamins-Minerals (EYE VITAMINS) capsule, Take 1 capsule by mouth daily., Disp: , Rfl:   •  oxybutynin XL (DITROPAN XL) 15 MG 24 hr tablet, Take 15 mg by mouth Daily., Disp: , Rfl:   •  potassium chloride (K-DUR) 10 MEQ CR tablet, Take 10 mEq by mouth Daily.,  "Disp: , Rfl:   •  Probiotic Product (PROBIOTIC ADVANCED PO), Take 1 capsule by mouth Daily., Disp: , Rfl:   •  tamsulosin (FLOMAX) 0.4 MG capsule 24 hr capsule, Take 1 capsule by mouth Daily., Disp: , Rfl:   •  torsemide (DEMADEX) 20 MG tablet, Take 0.5 tablets by mouth Daily., Disp: 15 tablet, Rfl: 3  •  Iron, Ferrous Gluconate, 256 (28 Fe) MG tablet, Take  by mouth Daily., Disp: , Rfl:     The following portions of the patient's history were reviewed and updated as appropriate: allergies, current medications, past family history, past medical history, past social history, past surgical history and problem list.    Review of Systems   Constitution: Positive for malaise/fatigue and weight loss.   Cardiovascular: Positive for leg swelling.   Genitourinary: Positive for frequency.       Objective:     Vitals:    06/27/18 1303 06/27/18 1308 06/27/18 1309   BP: 145/97 153/60 129/78   BP Location: Right arm Right arm Right arm   Patient Position: Sitting Sitting Standing   Cuff Size: Adult Adult Adult   Pulse: 53 58 71   Resp: 12     Temp: 96.7 °F (35.9 °C)     TempSrc: Temporal Artery      SpO2: 94%     Weight: 75.3 kg (166 lb)     Height: 177.8 cm (70\")           Physical Exam   Constitutional: He is oriented to person, place, and time. He appears well-developed and well-nourished. No distress.   HENT:   Head: Normocephalic and atraumatic.   Mouth/Throat: Oropharynx is clear and moist.   Eyes: Conjunctivae are normal. Pupils are equal, round, and reactive to light. No scleral icterus.   Neck: No hepatojugular reflux and no JVD present. Carotid bruit is not present. No tracheal deviation present. No thyromegaly present.   Cardiovascular: Normal rate, normal heart sounds and intact distal pulses.  An irregular rhythm present. Exam reveals no friction rub.    No murmur heard.  Pulmonary/Chest: Effort normal. No respiratory distress. He has no wheezes. He has no rales.   Abdominal: Soft. Bowel sounds are normal. He " exhibits no distension. There is no tenderness.   Musculoskeletal: He exhibits edema (1+ pitting edema, wearing compresssion.  mild srotal edema).   Lymphadenopathy:     He has no cervical adenopathy.   Neurological: He is alert and oriented to person, place, and time.   Skin: Skin is warm, dry and intact. No rash noted. No cyanosis or erythema. No pallor.   Psychiatric: He has a normal mood and affect. His behavior is normal. Thought content normal.   Vitals reviewed.      Lab and Diagnostic Review:  Lab Results   Component Value Date    GLUCOSE 108 (H) 05/14/2018    CALCIUM 8.5 (L) 05/14/2018     05/14/2018    K 4.5 05/14/2018    CO2 22.0 05/14/2018     (H) 05/14/2018    BUN 49 (H) 05/14/2018    CREATININE 2.30 (H) 05/14/2018    EGFRIFNONA 27 (L) 05/14/2018    BCR 21.3 05/14/2018    ANIONGAP 9.0 05/14/2018     Lab Results   Component Value Date    WBC 7.24 04/11/2018    HGB 10.0 (L) 04/11/2018    HCT 32.9 (L) 04/11/2018    MCV 93.2 04/11/2018     04/11/2018     Lab Results   Component Value Date    TSH 1.593 07/20/2017       Assessment and Plan:         1. Chronic edema  Improved  10lb weight loss  - Comprehensive Metabolic Panel; today  Torsemid 20 mg daily.  Pending lab results may need to decrease    2. Essential hypertension  No dizziness or syncope  Continue to monitor    3. Atrial flutter, unspecified type  HR controlled  Asa due to fall risk    F/u with Dr. Gomez as scheduled.  F/u [facility] 6 months or sooner if needed.    *Please note that portions of this note were completed with a voice recognition program. Efforts were made to edit the dictations, but occasionally words are mistranscribed.

## 2018-07-11 NOTE — PROGRESS NOTES
Chief Complaint   Patient presents with   • Follow-up     4 MONTH FOLLOW UP CHRONIC MEDICAL PROBLEMS       History of Present Illness      The patient presents for a follow-up related to hyperlipidemia. He is following a low fat diet. He reports that he is not exercising. He is not taking medication for hyperlipidemia. He denies chest pain, shortness of breath, orthopnea, paroxysmal nocturnal dyspnea, dyspnea on exertion, edema, palpitations or syncope.    The patient presents for a follow-up related to hypertension. The patient reports that he has had no headaches or blurred vision. He states that he is taking his medication as prescribed. He is not having medication side effects.    The patient presents for follow-up of constipation. The constipation has worsened. The patient has been compliant with treatment as outlined at the last visit. The patient's bowel movements occur approximately every three days. The stools are hard. Bowel Movements are not painful. There is no associated enuresis. The patient does not have abdominal pain. The patient denies neglecting the urge or putting off bowel movements.    The patient presents for a follow-up related to GERD. The patient is on Pepcid for his gastroesophageal reflux. The medication is taken on a regular basis and gives complete relief of the symptoms. He reports no belching, diarrhea, dysphagia, early satiety, heartburn, hoarseness, nausea, odynophagia, rectal bleeding, vomiting or weight loss. The GERD has no known aggravating factors.    Review of Systems    GASTROINTESTINAL- Reports: Constipation..    PULMONARY- Denies Wheezing, Sputum Production, Cough, Hemoptysis or Pleuritic Chest Pain.    Medications      Current Outpatient Prescriptions:   •  amLODIPine (NORVASC) 5 MG tablet, TAKE ONE TABLET BY MOUTH DAILY, Disp: 30 tablet, Rfl: 5  •  aspirin 325 MG tablet, Take 1 tablet by mouth daily., Disp: , Rfl:   •  Cetirizine HCl (ZYRTEC ALLERGY PO), Take 10 mg by  mouth Daily., Disp: , Rfl:   •  coenzyme Q10 100 MG capsule, Take 100 mg by mouth Daily., Disp: , Rfl:   •  donepezil (ARICEPT) 10 MG tablet, TAKE ONE TABLET BY MOUTH DAILY, Disp: 30 tablet, Rfl: 2  •  famotidine (PEPCID) 20 MG tablet, TAKE ONE TABLET BY MOUTH TWICE A DAY, Disp: 60 tablet, Rfl: 4  •  hydrALAZINE (APRESOLINE) 10 MG tablet, TAKE ONE TABLET BY MOUTH THREE TIMES A DAY, Disp: 90 tablet, Rfl: 2  •  Iron, Ferrous Gluconate, 256 (28 Fe) MG tablet, Take  by mouth Daily., Disp: , Rfl:   •  Multiple Vitamins-Minerals (CENTRUM SILVER PO), Take  by mouth Daily., Disp: , Rfl:   •  Multiple Vitamins-Minerals (EYE VITAMINS) capsule, Take 1 capsule by mouth daily., Disp: , Rfl:   •  potassium chloride (K-DUR) 10 MEQ CR tablet, Take 10 mEq by mouth Daily., Disp: , Rfl:   •  Probiotic Product (PROBIOTIC ADVANCED PO), Take 1 capsule by mouth Daily., Disp: , Rfl:   •  tamsulosin (FLOMAX) 0.4 MG capsule 24 hr capsule, Take 1 capsule by mouth Daily., Disp: , Rfl:   •  torsemide (DEMADEX) 20 MG tablet, Take 0.5 tablets by mouth Daily., Disp: 15 tablet, Rfl: 3     Allergies    Allergies   Allergen Reactions   • Atorvastatin Myalgia       Problem List    Patient Active Problem List   Diagnosis   • Anemia   • Abdominal aortic aneurysm (CMS/HCC)   • Benign prostatic hyperplasia   • Gastroesophageal reflux disease   • Unsteady gait   • Hyperlipidemia   • Hypertension   • Mild cognitive disorder   • Cerebrovascular accident (CMS/HCC)   • Bradycardia   • Constipation   • Laceration of finger of right hand   • Chronic midline low back pain without sciatica   • Closed compression fracture of thoracic vertebra (CMS/HCC)   • Right-sided carotid artery disease (CMS/HCC)   • Gynecomastia   • Memory loss   • Altered mental state   • Small bowel obstruction   • Deafness   • Prostate CA (CMS/HCC)   • Atrial flutter (CMS/HCC)   • AV heart block   • Localized edema   • CKD (chronic kidney disease)       Medications, Allergies, Problems List  and Past History were reviewed and updated.    Physical Examination    /68 (BP Location: Left arm, Patient Position: Sitting, Cuff Size: Adult)   Pulse 72   Temp 97.5 °F (36.4 °C) (Temporal Artery )   Resp 18   Wt 76.7 kg (169 lb)   BMI 24.25 kg/m²     HEENT: Head- Normocephalic Atraumatic. Facies- Within normal limits. Pinnas- Normal texture and shape bilaterally. Canals- Normal bilaterally. TMs- Normal bilaterally. Nares- Patent bilaterally. Nasal Septum- is normal. There is no tenderness to palpation over the frontal or maxillary sinuses. Lids- Normal bilaterally. Conjunctiva- Clear bilaterally. Sclera- Anicteric bilaterally. Oropharynx- Moist with no lesions. Tonsils- No enlargement, erythema or exudate.    Neck: Thyroid- non enlarged, symmetric and has no nodules. No bruits are detected. ROM- Normal Range of Motion with no rigidity.    Lungs: Auscultation- Clear to auscultation bilaterally. There are no retractions, clubbing or cyanosis. The Expiratory to Inspiratory ratio is equal.    Cardiovascular: There are no carotid bruits. Heart- Normal Rate with Regular rhythm and no murmurs. There are no gallops. There are no rubs. In the lower extremities there is no edema. The upper extremities do not have edema.    Abdomen: Soft, benign, non-tender with no masses, hernias, organomegaly or scars.    Radiology    My personal interpretation of the x-rays is as stated below:    The xray of the abdomen reveals constipation. There is a large amount of fecal matter located throught the descending colon, ascending colon, transverse colon, sigmoid colon and cecum.    Impression and Assessment    Hyperlipidemia.    Essential Hypertension.    Constipation.    Gastroesophageal Reflux Disease.    Plan    Constipation Plan: He was encouraged to increase the Miralax to twice daily.    Gastroesophageal Reflux Disease Plan: The current plan was continued.    Hyperlipidemia Plan: The patient was instructed to exercise  daily and eat a low fat diet.    Essential Hypertension Plan: The current plan was continued.    Kareem was seen today for follow-up.    Diagnoses and all orders for this visit:    Essential hypertension  -     Comprehensive Metabolic Panel  -     TSH    Gastroesophageal reflux disease without esophagitis  -     Comprehensive Metabolic Panel    Hyperlipidemia, unspecified hyperlipidemia type  -     Comprehensive Metabolic Panel  -     Lipid Panel    Atrial fibrillation, unspecified type (CMS/HCC)  -     Comprehensive Metabolic Panel  -     TSH    Constipation, unspecified constipation type  -     XR Abdomen KUB; Future  -     TSH          Return to Office    The patient was instructed to return for follow-up in 4 months.    The patient was instructed to return sooner if the condition changes, worsens, or doesn't resolve.

## 2018-07-30 NOTE — TELEPHONE ENCOUNTER
M-O-M 15 ML BID  Stool softener twice daily  Continue Miralax  If symptoms worsen, don't improve, develops a fever needs to be seen.   Eloy Aguilar MD  1:40 PM  07/30/18

## 2018-07-30 NOTE — TELEPHONE ENCOUNTER
----- Message from Arabella Ureña sent at 7/30/2018 10:32 AM EDT -----  PATIENT'S WIFE, MORGAN, CALLED AND STATED THAT PATIENT'S BOWEL PROBLEM HAS GOTTEN WORSE SINCE THE LAST TIME HE WAS IN THE OFFICE AND IS NEEDING TO DISCUSS NEXT STEPS.     PLEASE CALL BACK : [phone]    THANK YOU

## 2018-07-31 NOTE — TELEPHONE ENCOUNTER
S/W PT WIFE, BELOW INST FOR MOM 15 ML TWICE DAILY ALONG WITH STOOL SOFTNER TWICE DAILY AND CONTINUE MIRALAX.  DMIPLE GOOD UNDERSTANDING AND AGREEMENT.  STATES PT ALSO HAS AN INFECTION ON HIS LEG, STATES HE WENT TO [facility] ABOUT 2 WEEKS AGO AND COMPLETED 10 DAYS OF ABX THAT WERE GIVEN.  WAS LOOKING BETTER BUT NOW LOOKING AS BAD AS IT DID WHEN HE WENT TO START WITH.  APPT SCHEDULED FOR 11AM TOMORROW WITH DR PEPPER.  DIMPLE GREAT APPREC.

## 2018-08-01 NOTE — PROGRESS NOTES
Chief Complaint   Patient presents with   • WOUND ON RIGHT LOWER LEG       History of Present Illness      He presents for an initial evaluation with cellulitis on the right lateral leg. This has been present for two weeks. The condition is painful and worsening. There has not been a fever. Treatment has been administered at an urgent treatment center. The prior treatment consisted of oral antibiotics. This started as a puncture and has now become cellulitic. It improved slightly with the keflex.    The patient presents for follow-up of diarrhea. He states that his symptoms are stable. The diarrhea is watery. The patient's symptoms are not associated with abdominal pain. The symptoms are not associated with fever. The patient does not have other symptoms including a dry cough, a wet cough, wheezing, facial pain, a headache, eye drainage, ear pain, ear drainage, myalgias, sore throat, nasal discharge, decreased appetite, chills, lightheadedness, dizziness or dry mouth.     Review of Systems    GENERAL/CONSTITUTIONAL- Denies Unexplained Weight Loss, Sweats, Fatigue, Weakness or Malaise.    HEENT- Denies Sinus Pain, Nasal Congestion or Decreased Hearing.    CARDIOVASCULAR- Denies Chest Pain, Claudication, Edema, Syncope or Palpitations.    GASTROINTESTINAL- Reports: Diarrhea. Denies: Nausea, Vomiting, Blood per Rectum or Constipation.    PULMONARY- Denies Dyspnea, Sputum Production, Cough, Hemoptysis or Pleuritic Chest Pain.    Medications      Current Outpatient Prescriptions:   •  amLODIPine (NORVASC) 5 MG tablet, TAKE ONE TABLET BY MOUTH DAILY, Disp: 30 tablet, Rfl: 5  •  aspirin 325 MG tablet, Take 1 tablet by mouth daily., Disp: , Rfl:   •  Cetirizine HCl (ZYRTEC ALLERGY PO), Take 10 mg by mouth Daily., Disp: , Rfl:   •  coenzyme Q10 100 MG capsule, Take 100 mg by mouth Daily., Disp: , Rfl:   •  donepezil (ARICEPT) 10 MG tablet, TAKE ONE TABLET BY MOUTH DAILY, Disp: 30 tablet, Rfl: 2  •  famotidine (PEPCID) 20 MG  tablet, TAKE ONE TABLET BY MOUTH TWICE A DAY, Disp: 60 tablet, Rfl: 4  •  hydrALAZINE (APRESOLINE) 10 MG tablet, TAKE ONE TABLET BY MOUTH THREE TIMES A DAY, Disp: 90 tablet, Rfl: 2  •  Iron, Ferrous Gluconate, 256 (28 Fe) MG tablet, Take  by mouth Daily., Disp: , Rfl:   •  Multiple Vitamins-Minerals (CENTRUM SILVER PO), Take  by mouth Daily., Disp: , Rfl:   •  Multiple Vitamins-Minerals (EYE VITAMINS) capsule, Take 1 capsule by mouth daily., Disp: , Rfl:   •  mupirocin (BACTROBAN) 2 % ointment, Apply  topically 3 (Three) Times a Day., Disp: 15 g, Rfl: 3  •  potassium chloride (K-DUR) 10 MEQ CR tablet, Take 10 mEq by mouth Daily., Disp: , Rfl:   •  Probiotic Product (PROBIOTIC ADVANCED PO), Take 1 capsule by mouth Daily., Disp: , Rfl:   •  tamsulosin (FLOMAX) 0.4 MG capsule 24 hr capsule, Take 1 capsule by mouth Daily., Disp: , Rfl:   •  torsemide (DEMADEX) 20 MG tablet, Take 0.5 tablets by mouth Daily., Disp: 15 tablet, Rfl: 3  •  sulfamethoxazole-trimethoprim (BACTRIM DS,SEPTRA DS) 800-160 MG per tablet, Take 1 tablet by mouth 2 (Two) Times a Day., Disp: 20 tablet, Rfl: 0     Allergies    Allergies   Allergen Reactions   • Atorvastatin Myalgia       Problem List    Patient Active Problem List   Diagnosis   • Anemia   • Abdominal aortic aneurysm (CMS/HCC)   • Benign prostatic hyperplasia   • Gastroesophageal reflux disease   • Unsteady gait   • Hyperlipidemia   • Hypertension   • Mild cognitive disorder   • Cerebrovascular accident (CMS/HCC)   • Bradycardia   • Constipation   • Laceration of finger of right hand   • Chronic midline low back pain without sciatica   • Closed compression fracture of thoracic vertebra (CMS/HCC)   • Right-sided carotid artery disease (CMS/HCC)   • Gynecomastia   • Memory loss   • Altered mental state   • Small bowel obstruction   • Deafness   • Prostate CA (CMS/HCC)   • Atrial flutter (CMS/HCC)   • AV heart block   • Localized edema   • CKD (chronic kidney disease)       Medications,  Allergies, Problems List and Past History were reviewed and updated.    Physical Examination    /64 (BP Location: Right arm, Patient Position: Sitting, Cuff Size: Adult)   Pulse 56   Temp 97.8 °F (36.6 °C) (Temporal Artery )   Resp 20   Wt 71.7 kg (158 lb)   BMI 22.67 kg/m²       HEENT: Head- Normocephalic Atraumatic. Facies- Within normal limits. Pinnas- Normal texture and shape bilaterally. Canals- Normal bilaterally. TMs- Normal bilaterally. Nares- Patent bilaterally. Nasal Septum- is normal. There is no tenderness to palpation over the frontal or maxillary sinuses. Lids- Normal bilaterally. Conjunctiva- Clear bilaterally. Sclera- Anicteric bilaterally. Oropharynx- Moist with no lesions. Tonsils- No enlargement, erythema or exudate.    Neck: Thyroid- non enlarged, symmetric and has no nodules. No bruits are detected. ROM- Normal Range of Motion with no rigidity.    Lungs: Auscultation- Clear to auscultation bilaterally. There are no retractions, clubbing or cyanosis. The Expiratory to Inspiratory ratio is equal.    Cardiovascular: There are no carotid bruits. Heart- Normal Rate with Regular rhythm and no murmurs. There are no gallops. There are no rubs. In the lower extremities there is no edema. The upper extremities do not have edema.    Abdomen: Soft, benign, non-tender with no masses, hernias, organomegaly or scars.    The patient has cellulitis on the right lateral leg. The cellulitis is bright red. The affected skin is thickened and the condition is noted to be well demarcated.    Impression and Assessment    Cellulitis.    Diarrhea.    Plan    Diarrhea Plan: Probiotic foods were encouraged. Further plan following stool studies.    Cellulitis Plan: Medication was added as noted below.    Kareem was seen today for wound on right lower leg.    Diagnoses and all orders for this visit:    Cellulitis of right lower extremity  -     sulfamethoxazole-trimethoprim (BACTRIM DS,SEPTRA DS) 800-160 MG per  tablet; Take 1 tablet by mouth 2 (Two) Times a Day.    Diarrhea, unspecified type  -     Clostridium Difficile Toxin, PCR - Stool, Per Rectum; Future  -     Fecal Leukocytes - Stool, Per Rectum; Future  -     Stool Culture - Stool, Per Rectum; Future  -     Stool Culture With Yersinia - Stool, Per Rectum; Future        Return to Office    The patient was instructed to return for follow-up in 10 days.    The patient was instructed to return sooner if the condition changes, worsens, or doesn't resolve.

## 2018-09-10 NOTE — TELEPHONE ENCOUNTER
S/W PT WIFE, INFORMED THAT DR PEPPER SAID TO STOP THE MOM AND MIRALAX AND IF NOT BETTER LATER THIS WEEK TO CALL FOR APPT.  VERB GOOD UNDERSTANDING, AGREEMENT AND APPREC.

## 2018-09-10 NOTE — TELEPHONE ENCOUNTER
S/W PT WIFE,  PT SAW DR PEPPER FOR DIARRHEA AND WAS PUT ON MOM EVERY EVENING AND MIRALAX EVERY MORNING.  STATES DIARRHEA IS WORSE AND THAT EVERYTHING HE EATS, NO MATTER WHAT IT IS, GOES RIGHT THRU HIM.  STATES HAS TO RUN TO BR IN LESS THAN AN HOUR OF EATING EVERYTIME AND NOW HE IS NOT WANTING TO EAT DUE TO THIS.

## 2018-09-10 NOTE — TELEPHONE ENCOUNTER
Discontinue the miralax and MOM and schedule with me later this week or early next week if not improving.  Eloy Aguilar MD  3:46 PM  09/10/18

## 2018-09-10 NOTE — TELEPHONE ENCOUNTER
----- Message from Maci Brennan sent at 9/10/2018 12:28 PM EDT -----  PATIENTS WIFE MORGAN STATES PATIENT ISN'T DOING ANY BETTER SINCE VISIT AND WOULD LIKE A CALL BACK.    MORGAN PHONE: [phone]

## 2018-09-13 NOTE — TELEPHONE ENCOUNTER
WIFE CALLED BACK-PT IS NOT BETTER AND HAD TO STAY IN THE BATHROOM FOR 2 HOURS TODAY AFTER EATING.  PER LILLY AND DR DE JESUS, PT IS SCHEDULED FOR Friday AFTERNOON WITH GRANT.  WIFE DID NOT HAVE ANY QUESTIONS-SAID SHE WOULD WAIT FOR APPT TO DISCUSS

## 2018-09-14 NOTE — PROGRESS NOTES
Chief Complaint   Patient presents with   • Diarrhea     x 6 weeks       Subjective       History of Present Illness     Kareem Steiner is a 89 y.o. male. He presents with 6 week history of diarrhea. Pt and his wife provide the history. Wife states that pt has has 3-4 episodes of watery diarrhea several days of the week; however, having constipation for about 3 days prior to these episodes, follow by diarrhea. Patient recently discontinued miralax and milk of magnesia as advised by PCP. He is taking a probiotic daily, not refrigerated. He does have some mild discomfort at left lower abdomen when he becomes constipated, but no pain or cramping with diarrhea. No blood in stool or dark stool. He eats very little dairy. Not taking stool softeners at this time secondary to diarrhea. Previous recent stool studies negative.       The following portions of the patient's history were reviewed and updated as appropriate: allergies, current medications, past medical history, past social history and problem list.    Allergies   Allergen Reactions   • Atorvastatin Myalgia     Social History   Substance Use Topics   • Smoking status: Former Smoker     Types: Pipe   • Smokeless tobacco: Never Used      Comment: smoked a pipe daily for 50 yrs, quit 30 yrs ago   • Alcohol use No         Current Outpatient Prescriptions:   •  amLODIPine (NORVASC) 5 MG tablet, TAKE ONE TABLET BY MOUTH DAILY, Disp: 30 tablet, Rfl: 5  •  aspirin 325 MG tablet, Take 1 tablet by mouth daily., Disp: , Rfl:   •  Cetirizine HCl (ZYRTEC ALLERGY PO), Take 10 mg by mouth Daily., Disp: , Rfl:   •  coenzyme Q10 100 MG capsule, Take 100 mg by mouth Daily., Disp: , Rfl:   •  donepezil (ARICEPT) 10 MG tablet, TAKE ONE TABLET BY MOUTH DAILY, Disp: 30 tablet, Rfl: 1  •  famotidine (PEPCID) 20 MG tablet, TAKE ONE TABLET BY MOUTH TWICE A DAY, Disp: 60 tablet, Rfl: 3  •  hydrALAZINE (APRESOLINE) 10 MG tablet, TAKE ONE TABLET BY MOUTH THREE TIMES A DAY, Disp: 90 tablet,  Rfl: 2  •  Multiple Vitamins-Minerals (CENTRUM SILVER PO), Take 1 tablet by mouth Daily., Disp: , Rfl:   •  Multiple Vitamins-Minerals (EYE VITAMINS) capsule, Take 1 capsule by mouth daily., Disp: , Rfl:   •  mupirocin (BACTROBAN) 2 % ointment, Apply  topically 3 (Three) Times a Day., Disp: 15 g, Rfl: 3  •  potassium chloride (K-DUR) 10 MEQ CR tablet, Take 10 mEq by mouth Daily., Disp: , Rfl:   •  Probiotic Product (PROBIOTIC ADVANCED PO), Take 1 capsule by mouth Daily., Disp: , Rfl:   •  tamsulosin (FLOMAX) 0.4 MG capsule 24 hr capsule, Take 1 capsule by mouth Daily., Disp: , Rfl:   •  torsemide (DEMADEX) 20 MG tablet, Take 0.5 tablets by mouth Daily., Disp: 15 tablet, Rfl: 3  •  linaclotide (LINZESS) 72 MCG capsule capsule, Take 1 capsule by mouth Every Morning Before Breakfast., Disp: 30 capsule, Rfl: 2    Review of Systems   Constitutional: Negative for chills, fatigue and fever.   HENT: Negative for congestion, ear pain and sore throat.    Respiratory: Negative for cough and shortness of breath.    Cardiovascular: Negative for chest pain and palpitations.   Gastrointestinal: Positive for constipation and diarrhea. Negative for abdominal pain, blood in stool, nausea and vomiting.   Genitourinary: Negative for dysuria and hematuria.   Skin: Negative for rash.   Neurological: Negative for dizziness, syncope and headache.   Psychiatric/Behavioral: Negative for depressed mood. The patient is not nervous/anxious.        Objective   Vitals:    09/14/18 1400   BP: 108/60   Pulse: (!) 46   Resp: 16   Temp: 97.1 °F (36.2 °C)   SpO2: 97%     Physical Exam   Constitutional: He appears well-developed and well-nourished.   HENT:   Head: Normocephalic and atraumatic.   Nose: Nose normal.   Mouth/Throat: Oropharynx is clear and moist and mucous membranes are normal.   Eyes: Pupils are equal, round, and reactive to light. Conjunctivae are normal.   Neck: Normal range of motion. Neck supple. Carotid bruit is not present. No  thyromegaly present.   Cardiovascular: Normal rate, regular rhythm and intact distal pulses.    No murmur heard.  Pulmonary/Chest: Effort normal and breath sounds normal. He has no wheezes. He has no rales.   Abdominal: Soft. There is no hepatosplenomegaly. There is no tenderness.   Lymphadenopathy:     He has no cervical adenopathy.   Skin: No rash noted.   Psychiatric: He has a normal mood and affect. His behavior is normal.       KUB Impression: No obstruction, large amount of fecal material consistent with constipation.       Assessment/Plan   Kareem was seen today for diarrhea.    Diagnoses and all orders for this visit:    Alternating constipation and diarrhea  -     XR Abdomen KUB; Future  -     linaclotide (LINZESS) 72 MCG capsule capsule; Take 1 capsule by mouth Every Morning Before Breakfast.      Discussed that this is likely constipation related more than diarrhea, and need to resolve constipation to reduce diarrhea frequency as well.   Continue probiotic. Encouraged to get probiotic with billions in number, which needs to be refrigerated. Inquire at pharmacy for best probiotic choice.  Continue healthy diet and reduced dairy at this time. Plenty of water.  Will trial Linzess low dose for improvement of these issues.          Return for Next scheduled follow up.

## 2018-09-19 NOTE — TELEPHONE ENCOUNTER
Last seen on 6/27/18 and will follow up on 11/27/18. Refills for torsemide 10mg daily sent to [facility] on [address].    Paty Rea, PharmD

## 2018-10-01 NOTE — TELEPHONE ENCOUNTER
Spoke to wife Valerie. Pt a little improved with cyclical constipation/ diarrhea, but still having these problems just less severe. Taking Linzess daily as directed. I advised to increase to Linzess 72mcg  2 tabs daily at this time for improvement of constipation. Pt has f/u with Dr. Aguilar 11/1/2018. Wife verbalized understanding and in agreement with this plan.

## 2018-10-05 NOTE — TELEPHONE ENCOUNTER
Pt's wife requested to f/u via phone with either Nohemi Castañeda or Dr. Aguilar.  She states she s/w Nohemi Castañeda 10/1/18 in regards to constipation / diarrhea. Pt wife says pt is taking Linzess 72mcg 2 tabs daily as recommended. She reports some improvement however pt is having multiple episodes of diarrhea daily. She states pt will experience diarrhea with urgency within 30 minutes of eating a meal and is having difficulty getting to restroom in time. Sx's are affecting his day-to-day activities / trying to time outings and po intake to ensure he is close to a restroom after meals. Any further recs at this time? She states she was going to call PCP office Monday but requested to leave [facility] [place] [facility] today as I had called regarding AWV. Pt wife can be reached at [phone]. Thank you.    Please advise

## 2018-10-05 NOTE — TELEPHONE ENCOUNTER
Called pt to scheduled Subsequent Medicare Wellness Visit.  S/w pt wife who is listed on [facility]. She agreed to schedule and appt made 11/20/18 2:00pm with Nelly Garza     Pt's wife requested to f/u via phone with either Nohemi Castañeda or Dr. Aguilar.  She states she s/w Nohemi Castañeda 10/1/18 in regards to constipation / diarrhea. Pt wife says pt is taking Linzess 72mcg 2 tabs daily as recommended. She reports some improvement however pt is having multiple episodes of diarrhea daily. She states pt will experience diarrhea with urgency within 30 minutes of eating a meal and is having difficulty getting to restroom in time. Sx's are affecting his day-to-day activities / trying to time outings and po intake to ensure he is close to a restroom after meals. Any further recs at this time? She states she was going to call PCP office Monday but [place] [facility] [place] had called regarding AWV. Pt wife can be reached at [phone]. Thank you.

## 2018-10-08 NOTE — TELEPHONE ENCOUNTER
Spoke to pt's wife regarding this issue. She states that pt was doing better over the weekend,but previously having 4-5 loose BMs/ day. He is taking Linzess as directed and has noticed some improvement in frequency and urgency of BMs, but not total relief. Still taking probiotic.  We discussed balancing constipation as primary issue, with diarrhea as secondary issue due to constipation. Advised high fiber diet as tolerated and continue current meds as pt has had some improvement after 2+ months of these issues. If no improvement in next 1-2 weeks, will see in clinic sooner. Pt's wife verbalized understanding and in agreement with this plan.

## 2018-11-05 NOTE — PROGRESS NOTES
Chief Complaint   Patient presents with   • Follow-up     4 MONTH FOLLOW UP CHRONIC MEDICAL PROBLEMS   • Diarrhea       History of Present Illness    The patient presents for follow-up of diarrhea. He states that his symptoms have worsened. The diarrhea is watery. The patient's symptoms are not associated with abdominal pain. The symptoms are not associated with fever. The patient does not have other symptoms including a dry cough, a wet cough, wheezing, facial pain, a headache, eye drainage, ear pain, ear drainage, myalgias, sore throat, nasal discharge, decreased appetite, chills, lightheadedness, dizziness or dry mouth.   The patient presents for a follow-up related to hypertension. The patient reports that he has had no chest pain, dyspnea, edema, syncope, blurred vision or palpitations. He states that he is taking his medication as prescribed. He is not having medication side effects.    The patient presents for a follow-up related to hyperlipidemia. He is not following a low fat diet. He reports that he is not exercising. He is not taking medication for hyperlipidemia. He denies orthopnea, paroxysmal nocturnal dyspnea or dyspnea on exertion.    The patient presents for a follow-up related to prostate cancer. He has an abdominal mass, which is presumed prostatic in nature. He has declined agressive intervention in the past.       The patient denies back pain, bone pain or rib pain. There has been no associated anorexia, confusion, depression, fatigue, neuropathy symptoms, night sweats, pelvic pain, skin changes, urinary symptoms or unexplained weight loss. He denies cough or hemoptysis. The patient is following with Urology.    Review of Systems    GENERAL/CONSTITUTIONAL- Denies Sweats, Weakness or Malaise.    HEENT- Denies Eye Pain, Decreased Vision, Sinus Pain, Nasal Congestion, Decreased Hearing, Visual Disturbance, Diplopia or Tinnitus.    CARDIOVASCULAR- Denies Claudication.    GASTROINTESTINAL- Reports:  Diarrhea. Denies: Nausea, Vomiting, Blood per Rectum or Constipation.    PULMONARY- Denies Sputum Production or Pleuritic Chest Pain.    ENDOCRINE- Denies Cold Intolerance, Hair Loss, Heat Intolerance, Memory Loss, Polydypsia, Polyphagia, Polyuria, Sleep Disturbance or Weight Gain.    Medications      Current Outpatient Prescriptions:   •  amLODIPine (NORVASC) 5 MG tablet, TAKE ONE TABLET BY MOUTH DAILY, Disp: 30 tablet, Rfl: 5  •  aspirin 325 MG tablet, Take 1 tablet by mouth daily., Disp: , Rfl:   •  Cetirizine HCl (ZYRTEC ALLERGY PO), Take 10 mg by mouth Daily., Disp: , Rfl:   •  coenzyme Q10 100 MG capsule, Take 100 mg by mouth Daily., Disp: , Rfl:   •  donepezil (ARICEPT) 10 MG tablet, TAKE ONE TABLET BY MOUTH DAILY, Disp: 30 tablet, Rfl: 0  •  famotidine (PEPCID) 20 MG tablet, TAKE ONE TABLET BY MOUTH TWICE A DAY, Disp: 60 tablet, Rfl: 3  •  hydrALAZINE (APRESOLINE) 10 MG tablet, TAKE ONE TABLET BY MOUTH THREE TIMES A DAY, Disp: 90 tablet, Rfl: 1  •  linaclotide (LINZESS) 72 MCG capsule capsule, Take 1 capsule by mouth Every Morning Before Breakfast., Disp: 30 capsule, Rfl: 2  •  Multiple Vitamins-Minerals (CENTRUM SILVER PO), Take 1 tablet by mouth Daily., Disp: , Rfl:   •  Multiple Vitamins-Minerals (EYE VITAMINS) capsule, Take 1 capsule by mouth daily., Disp: , Rfl:   •  mupirocin (BACTROBAN) 2 % ointment, Apply  topically 3 (Three) Times a Day., Disp: 15 g, Rfl: 3  •  potassium chloride (K-DUR) 10 MEQ CR tablet, Take 10 mEq by mouth Daily., Disp: , Rfl:   •  Probiotic Product (PROBIOTIC ADVANCED PO), Take 1 capsule by mouth Daily., Disp: , Rfl:   •  tamsulosin (FLOMAX) 0.4 MG capsule 24 hr capsule, Take 1 capsule by mouth Daily., Disp: , Rfl:   •  torsemide (DEMADEX) 20 MG tablet, TAKE ONE-HALF TABLET BY MOUTH DAILY, Disp: 15 tablet, Rfl: 3  •  cholestyramine light (PREVALITE) 4 g packet, Take 1 packet by mouth 2 (Two) Times a Day., Disp: 60 packet, Rfl: 2     Allergies    Allergies   Allergen Reactions    • Atorvastatin Myalgia       Problem List    Patient Active Problem List   Diagnosis   • Anemia   • Abdominal aortic aneurysm (CMS/HCC)   • Benign prostatic hyperplasia   • Gastroesophageal reflux disease   • Unsteady gait   • Hyperlipidemia   • Hypertension   • Mild cognitive disorder   • Cerebrovascular accident (CMS/HCC)   • Bradycardia   • Constipation   • Laceration of finger of right hand   • Chronic midline low back pain without sciatica   • Closed compression fracture of thoracic vertebra (CMS/HCC)   • Right-sided carotid artery disease (CMS/HCC)   • Gynecomastia   • Memory loss   • Altered mental state   • Small bowel obstruction (CMS/HCC)   • Deafness   • Prostate CA (CMS/HCC)   • Atrial flutter (CMS/HCC)   • AV heart block   • Localized edema   • CKD (chronic kidney disease)       Medications, Allergies, Problems List and Past History were reviewed and updated.    Physical Examination    /60 (BP Location: Left arm, Patient Position: Sitting, Cuff Size: Adult)   Pulse 56   Temp 98 °F (36.7 °C) (Temporal Artery )   Resp 20   Wt 66.2 kg (146 lb)   BMI 20.95 kg/m²     HEENT: Head- Normocephalic Atraumatic. Facies- Within normal limits. Pinnas- Normal texture and shape bilaterally. Canals- Normal bilaterally. TMs- Normal bilaterally. Nares- Patent bilaterally. Nasal Septum- is normal. There is no tenderness to palpation over the frontal or maxillary sinuses. Lids- Normal bilaterally. Conjunctiva- Clear bilaterally. Sclera- Anicteric bilaterally. Oropharynx- Moist with no lesions. Tonsils- No enlargement, erythema or exudate.    Neck: Thyroid- non enlarged, symmetric and has no nodules. No bruits are detected. ROM- Normal Range of Motion with no rigidity.    Lymph Nodes: Cervical- no enlarged lymph nodes noted. Clavicular- Deferred. Axillary- Deferred. Inguinal- Deferred.    Lungs: Auscultation- Clear to auscultation bilaterally. There are no retractions, clubbing or cyanosis. The Expiratory to  Inspiratory ratio is equal.    Cardiovascular: There are no carotid bruits. Heart- Normal Rate with Regular rhythm and no murmurs. There are no gallops. There are no rubs. In the lower extremities there is no edema. The upper extremities do not have edema.    Abdomen: Soft, benign, non-tender with no masses, hernias, organomegaly or scars.    Impression and Assessment    Prostate Cancer.    Diarrhea.    Essential Hypertension.    Hyperlipidemia.    Abdominal Mass.    Plan    Diarrhea Plan: Further plans will be made after the tests are reviewed.    Abdominal Mass Plan: Further plans will be made after the tests are reviewed.    Essential Hypertension Plan: The condition is stable. No change was made in the current plan.    Hyperlipidemia Plan: The condition is stable. No change was made in the current plan.    Prostate Cancer Plan: He will follow-up with urology.    Kareem was seen today for follow-up and diarrhea.    Diagnoses and all orders for this visit:    Diarrhea, unspecified type  -     XR Abdomen KUB; Future  -     CT Abdomen Pelvis Without Contrast; Future  -     Ambulatory Referral For Screening Colonoscopy  -     Comprehensive Metabolic Panel  -     TSH  -     T4, Free  -     CBC & Differential  -     Clostridium Difficile Toxin, PCR - Stool, Per Rectum; Future  -     Fecal Leukocytes - Stool, Per Rectum; Future  -     Ova & Parasite Examination - Stool, Per Rectum; Future  -     Stool Culture - Stool, Per Rectum; Future  -     Stool Culture With Yersinia - Stool, Per Rectum; Future  -     cholestyramine light (PREVALITE) 4 g packet; Take 1 packet by mouth 2 (Two) Times a Day.  -     CBC Auto Differential  -     Clostridium Difficile Toxin, PCR - Stool, Per Rectum  -     Fecal Leukocytes - Stool, Per Rectum  -     Ova & Parasite Examination - Stool, Per Rectum  -     Cancel: Stool Culture - Stool, Per Rectum  -     Stool Culture With Yersinia - Stool, Per Rectum    Abdominal mass, unspecified abdominal  location  -     CT Abdomen Pelvis Without Contrast; Future  -     Ambulatory Referral For Screening Colonoscopy  -     Comprehensive Metabolic Panel  -     TSH  -     T4, Free  -     CBC & Differential  -     CBC Auto Differential    Essential hypertension  -     Comprehensive Metabolic Panel  -     POC Urinalysis Dipstick, Automated    Hyperlipidemia, unspecified hyperlipidemia type  -     Comprehensive Metabolic Panel    Prostate CA (CMS/HCC)  -     Comprehensive Metabolic Panel  -     PSA DIAGNOSTIC        Return to Office    The patient was instructed to return for follow-up in 1 month.    The patient was instructed to return sooner if the condition changes, worsens, or doesn't resolve.

## 2018-11-15 NOTE — PROGRESS NOTES
Chief Complaint   Patient presents with   • Follow-up     2 WEEK FOLLOW UP        History of Present Illness    The patient presents for a follow-up related to prostate cancer.       The patient reports back pain and bone pain but denies rib pain. The bone pain is constant. There has been abdominal pain, anorexia, fatigue and pelvic pain but the patient has not had confusion, depression, fevers, headache, neuropathy symptoms, night sweats, skin changes, urinary symptoms, visual change or unexplained weight loss. He denies cough, chest pain or hemoptysis. The patient is following with Urology. His CT abdomen and pelvis reveal diffuse metastatic disease with near complete involvement of the liver. Diffuse lymphatic involvement and an enlarging pelvic mass. Tresa involvement is also noted. His PSA was >100.    Medications      Current Outpatient Medications:   •  amLODIPine (NORVASC) 5 MG tablet, TAKE ONE TABLET BY MOUTH DAILY, Disp: 30 tablet, Rfl: 5  •  aspirin 325 MG tablet, Take 1 tablet by mouth daily., Disp: , Rfl:   •  Cetirizine HCl (ZYRTEC ALLERGY PO), Take 10 mg by mouth Daily., Disp: , Rfl:   •  cholestyramine light (PREVALITE) 4 g packet, Take 1 packet by mouth 2 (Two) Times a Day., Disp: 60 packet, Rfl: 2  •  coenzyme Q10 100 MG capsule, Take 100 mg by mouth Daily., Disp: , Rfl:   •  donepezil (ARICEPT) 10 MG tablet, TAKE ONE TABLET BY MOUTH DAILY, Disp: 30 tablet, Rfl: 0  •  famotidine (PEPCID) 20 MG tablet, TAKE ONE TABLET BY MOUTH TWICE A DAY, Disp: 60 tablet, Rfl: 3  •  hydrALAZINE (APRESOLINE) 10 MG tablet, TAKE ONE TABLET BY MOUTH THREE TIMES A DAY, Disp: 90 tablet, Rfl: 1  •  linaclotide (LINZESS) 72 MCG capsule capsule, Take 1 capsule by mouth Every Morning Before Breakfast., Disp: 30 capsule, Rfl: 2  •  Multiple Vitamins-Minerals (CENTRUM SILVER PO), Take 1 tablet by mouth Daily., Disp: , Rfl:   •  Multiple Vitamins-Minerals (EYE VITAMINS) capsule, Take 1 capsule by mouth daily., Disp: , Rfl:   •   potassium chloride (K-DUR) 10 MEQ CR tablet, Take 10 mEq by mouth Daily., Disp: , Rfl:   •  Probiotic Product (PROBIOTIC ADVANCED PO), Take 1 capsule by mouth Daily., Disp: , Rfl:   •  tamsulosin (FLOMAX) 0.4 MG capsule 24 hr capsule, Take 1 capsule by mouth Daily., Disp: , Rfl:   •  torsemide (DEMADEX) 20 MG tablet, TAKE ONE-HALF TABLET BY MOUTH DAILY, Disp: 15 tablet, Rfl: 3     Allergies    Allergies   Allergen Reactions   • Atorvastatin Myalgia       Problem List    Patient Active Problem List   Diagnosis   • Anemia   • Abdominal aortic aneurysm (CMS/HCC)   • Benign prostatic hyperplasia   • Gastroesophageal reflux disease   • Unsteady gait   • Hyperlipidemia   • Hypertension   • Mild cognitive disorder   • Cerebrovascular accident (CMS/HCC)   • Bradycardia   • Constipation   • Laceration of finger of right hand   • Chronic midline low back pain without sciatica   • Closed compression fracture of thoracic vertebra (CMS/HCC)   • Right-sided carotid artery disease (CMS/HCC)   • Gynecomastia   • Memory loss   • Altered mental state   • Small bowel obstruction (CMS/HCC)   • Deafness   • Prostate CA (CMS/HCC)   • Atrial flutter (CMS/HCC)   • AV heart block   • Localized edema   • CKD (chronic kidney disease)       Medications, Allergies, Problems List and Past History were reviewed and updated.    Physical Examination    /58 (BP Location: Left arm, Patient Position: Sitting, Cuff Size: Adult)   Pulse 60   Temp 97.6 °F (36.4 °C) (Temporal)   Resp 18   Wt 67 kg (147 lb 12.8 oz)   BMI 21.21 kg/m²     HEENT: Facies- Within normal limits. Lids- Normal bilaterally. Conjunctiva- Clear bilaterally. Sclera- Anicteric bilaterally.    Neck: Thyroid- non enlarged, symmetric and has no nodules. No bruits are detected. ROM- Normal Range of Motion with no rigidity.    Lymph Nodes: Cervical- no enlarged lymph nodes noted. Clavicular- Deferred. Axillary- Deferred. Inguinal- Deferred.    Lungs: Auscultation- Clear to  auscultation bilaterally. There are no retractions, clubbing or cyanosis. The Expiratory to Inspiratory ratio is equal.    Cardiovascular: There are no carotid bruits. Heart- Normal Rate with Regular rhythm and no murmurs. There are no gallops. There are no rubs. In the lower extremities there is no edema. The upper extremities do not have edema.    Abdomen: Soft, benign, non-tender with no masses, hernias, organomegaly or scars.    Impression and Assessment    Prostate Cancer.    Plan    Prostate Cancer Plan: We had a long discussion (40 minutes) regarding treatment options including comfort measures, radiation, hormonal therapy. Discussed with the patient, his wife, and daughter. Also discussed with Dr. Clement in urology. Patient has decided to proceed with comfort measures and a hospice referral will be made. All questions were answered.      Return to Office    The patient was instructed to return for follow-up in 2 months.    The patient was instructed to return sooner if the condition changes, worsens, or doesn't resolve.    Documentation of Time    A total of 40 minutes were spent in direct patient care activities. Greater than 50% of this time was spent in counselling and discussion regarding the patient's treatment.

## 2019-01-09 ENCOUNTER — TELEPHONE (OUTPATIENT)
Dept: INTERNAL MEDICINE | Facility: CLINIC | Age: 84
End: 2019-01-09